# Patient Record
Sex: MALE | Race: WHITE | Employment: FULL TIME | URBAN - METROPOLITAN AREA
[De-identification: names, ages, dates, MRNs, and addresses within clinical notes are randomized per-mention and may not be internally consistent; named-entity substitution may affect disease eponyms.]

---

## 2017-02-06 ENCOUNTER — TELEPHONE (OUTPATIENT)
Dept: FAMILY MEDICINE CLINIC | Facility: CLINIC | Age: 42
End: 2017-02-06

## 2017-02-06 RX ORDER — TADALAFIL 10 MG/1
TABLET ORAL
Qty: 10 TABLET | Refills: 3 | Status: SHIPPED | OUTPATIENT
Start: 2017-02-06 | End: 2020-09-04

## 2017-02-06 NOTE — TELEPHONE ENCOUNTER
Patient had tried Levitra in the past but did not seem to help with the erectile dysfunction. He did have testosterone levels checked free which was normal.  He would like to try Cialis. Will prescribe Cialis. If not better patient will see urology.

## 2017-07-11 ENCOUNTER — OFFICE VISIT (OUTPATIENT)
Dept: FAMILY MEDICINE CLINIC | Facility: CLINIC | Age: 42
End: 2017-07-11

## 2017-07-11 VITALS
HEART RATE: 53 BPM | RESPIRATION RATE: 12 BRPM | WEIGHT: 178 LBS | OXYGEN SATURATION: 100 % | SYSTOLIC BLOOD PRESSURE: 120 MMHG | HEIGHT: 69 IN | DIASTOLIC BLOOD PRESSURE: 62 MMHG | BODY MASS INDEX: 26.36 KG/M2

## 2017-07-11 DIAGNOSIS — Z00.00 ROUTINE GENERAL MEDICAL EXAMINATION AT A HEALTH CARE FACILITY: Primary | ICD-10-CM

## 2017-07-11 DIAGNOSIS — R41.3 MEMORY LOSS: ICD-10-CM

## 2017-07-11 DIAGNOSIS — S46.002S OSTEOARTHRITIS OF LEFT SHOULDER DUE TO ROTATOR CUFF INJURY: ICD-10-CM

## 2017-07-11 DIAGNOSIS — R45.4 IRRITABILITY AND ANGER: ICD-10-CM

## 2017-07-11 DIAGNOSIS — M19.112 OSTEOARTHRITIS OF LEFT SHOULDER DUE TO ROTATOR CUFF INJURY: ICD-10-CM

## 2017-07-11 DIAGNOSIS — Z00.00 LABORATORY EXAM ORDERED AS PART OF ROUTINE GENERAL MEDICAL EXAMINATION: ICD-10-CM

## 2017-07-11 PROCEDURE — 99386 PREV VISIT NEW AGE 40-64: CPT | Performed by: FAMILY MEDICINE

## 2017-07-11 PROCEDURE — 99202 OFFICE O/P NEW SF 15 MIN: CPT | Performed by: FAMILY MEDICINE

## 2017-07-11 RX ORDER — EPINEPHRINE 0.3 MG/.3ML
0.3 INJECTION SUBCUTANEOUS ONCE
Qty: 2 EACH | Refills: 0 | Status: SHIPPED | OUTPATIENT
Start: 2017-07-11 | End: 2017-07-11

## 2017-07-12 PROBLEM — M19.112 OSTEOARTHRITIS OF LEFT SHOULDER DUE TO ROTATOR CUFF INJURY: Status: ACTIVE | Noted: 2017-07-12

## 2017-07-12 PROBLEM — R45.4 IRRITABILITY AND ANGER: Status: ACTIVE | Noted: 2017-07-12

## 2017-07-12 PROBLEM — R41.3 MEMORY LOSS: Status: ACTIVE | Noted: 2017-07-12

## 2017-07-12 PROBLEM — S46.002S OSTEOARTHRITIS OF LEFT SHOULDER DUE TO ROTATOR CUFF INJURY: Status: ACTIVE | Noted: 2017-07-12

## 2017-07-12 NOTE — PROGRESS NOTES
Sukhdeep Trevizo is a 38year old male who presents for a complete physical exam.   HPI:   Pt complains of chronic left shoulder pain, possible labral tear, with a lot of popping, crepitus. He is considering stem cell treatment from someone he knows.  Would l family history on file. Social History:  Smoking status: Never Smoker                                                              Smokeless tobacco: Never Used                      Alcohol use:  Yes              Comment: 3 times per week        REVIEW OF normoactive BS, non-distended, non-tender to palpation, no HSM/masses/pulsations  MUSCULOSKELETAL: Back with normal AROM, no joint swelling, extremities x 4 with normal strength 5/5 and symmetric and with normal AROM/PROM.    EXTREMITIES: no cyanosis, clubb discussed. If he does use he should call 911. Patient understands. I would also recommend heart scan for heart disease screening. Regarding his shoulder I do recommend he get MRI as he may have labral tear. He can then seek stem cell therapy.     Faith

## 2017-07-19 ENCOUNTER — HOSPITAL ENCOUNTER (OUTPATIENT)
Dept: MRI IMAGING | Age: 42
Discharge: HOME | End: 2017-07-19
Attending: FAMILY MEDICINE
Payer: COMMERCIAL

## 2017-07-19 DIAGNOSIS — S46.002S OSTEOARTHRITIS OF LEFT SHOULDER DUE TO ROTATOR CUFF INJURY: ICD-10-CM

## 2017-07-19 DIAGNOSIS — M19.112 OSTEOARTHRITIS OF LEFT SHOULDER DUE TO ROTATOR CUFF INJURY: ICD-10-CM

## 2017-07-19 PROCEDURE — A9575 INJ GADOTERATE MEGLUMI 0.1ML: HCPCS | Performed by: FAMILY MEDICINE

## 2017-07-19 PROCEDURE — 73223 MRI JOINT UPR EXTR W/O&W/DYE: CPT | Performed by: FAMILY MEDICINE

## 2017-08-29 ENCOUNTER — APPOINTMENT (OUTPATIENT)
Dept: LAB | Age: 42
End: 2017-08-29
Attending: FAMILY MEDICINE
Payer: COMMERCIAL

## 2017-08-29 DIAGNOSIS — Z00.00 LABORATORY EXAM ORDERED AS PART OF ROUTINE GENERAL MEDICAL EXAMINATION: ICD-10-CM

## 2017-08-29 LAB
ALBUMIN SERPL-MCNC: 4.2 G/DL (ref 3.5–4.8)
ALP LIVER SERPL-CCNC: 55 U/L (ref 45–117)
ALT SERPL-CCNC: 76 U/L (ref 17–63)
AST SERPL-CCNC: 40 U/L (ref 15–41)
BILIRUB SERPL-MCNC: 0.5 MG/DL (ref 0.1–2)
BUN BLD-MCNC: 20 MG/DL (ref 8–20)
CALCIUM BLD-MCNC: 8.8 MG/DL (ref 8.3–10.3)
CHLORIDE: 105 MMOL/L (ref 101–111)
CHOLEST SMN-MCNC: 191 MG/DL (ref ?–200)
CO2: 29 MMOL/L (ref 22–32)
CREAT BLD-MCNC: 1.06 MG/DL (ref 0.7–1.3)
ERYTHROCYTE [DISTWIDTH] IN BLOOD BY AUTOMATED COUNT: 13.1 % (ref 11.5–16)
FREE T4: 0.9 NG/DL (ref 0.9–1.8)
GLUCOSE BLD-MCNC: 96 MG/DL (ref 70–99)
HCT VFR BLD AUTO: 45.3 % (ref 37–53)
HDLC SERPL-MCNC: 66 MG/DL (ref 45–?)
HDLC SERPL: 2.89 {RATIO} (ref ?–4.97)
HGB BLD-MCNC: 15.1 G/DL (ref 13–17)
LDLC SERPL CALC-MCNC: 115 MG/DL (ref ?–130)
LDLC SERPL-MCNC: 10 MG/DL (ref 5–40)
M PROTEIN MFR SERPL ELPH: 7.3 G/DL (ref 6.1–8.3)
MCH RBC QN AUTO: 31.3 PG (ref 27–33.2)
MCHC RBC AUTO-ENTMCNC: 33.3 G/DL (ref 31–37)
MCV RBC AUTO: 94 FL (ref 80–99)
NONHDLC SERPL-MCNC: 125 MG/DL (ref ?–130)
PLATELET # BLD AUTO: 210 10(3)UL (ref 150–450)
POTASSIUM SERPL-SCNC: 4.3 MMOL/L (ref 3.6–5.1)
RBC # BLD AUTO: 4.82 X10(6)UL (ref 4.3–5.7)
RED CELL DISTRIBUTION WIDTH-SD: 44.7 FL (ref 35.1–46.3)
SODIUM SERPL-SCNC: 139 MMOL/L (ref 136–144)
TRIGLYCERIDES: 49 MG/DL (ref ?–150)
TSI SER-ACNC: 2.59 MIU/ML (ref 0.35–5.5)
WBC # BLD AUTO: 5.9 X10(3) UL (ref 4–13)

## 2017-08-29 PROCEDURE — 80061 LIPID PANEL: CPT

## 2017-08-29 PROCEDURE — 84439 ASSAY OF FREE THYROXINE: CPT

## 2017-08-29 PROCEDURE — 80053 COMPREHEN METABOLIC PANEL: CPT

## 2017-08-29 PROCEDURE — 84443 ASSAY THYROID STIM HORMONE: CPT

## 2017-08-29 PROCEDURE — 36415 COLL VENOUS BLD VENIPUNCTURE: CPT

## 2017-08-29 PROCEDURE — 85027 COMPLETE CBC AUTOMATED: CPT

## 2017-09-12 ENCOUNTER — TELEPHONE (OUTPATIENT)
Dept: FAMILY MEDICINE CLINIC | Facility: CLINIC | Age: 42
End: 2017-09-12

## 2017-09-12 DIAGNOSIS — R74.8 ELEVATED LIVER ENZYMES: Primary | ICD-10-CM

## 2017-09-12 NOTE — TELEPHONE ENCOUNTER
Spoke to pt regarding lab results. Will order ast/alt per verbal order from   Pt is in agreement. Also, pt states he would like to fill out an adhd questionnaire.  After speaking with dr. Gonzalo Kinney he is ok with patient faxing us the adhd questionna

## 2017-10-12 ENCOUNTER — TELEPHONE (OUTPATIENT)
Dept: FAMILY MEDICINE CLINIC | Facility: CLINIC | Age: 42
End: 2017-10-12

## 2017-10-19 ENCOUNTER — MED REC SCAN ONLY (OUTPATIENT)
Dept: FAMILY MEDICINE CLINIC | Facility: CLINIC | Age: 42
End: 2017-10-19

## 2017-12-05 PROBLEM — F32.A DEPRESSION: Status: ACTIVE | Noted: 2017-12-05

## 2017-12-05 NOTE — PROGRESS NOTES
HPI:    Patient ID: Ernesto Prakash is a 38year old male. HPI  The patient is here for evaluation of irritability and anger which is bad at times. He does have some depressed mood at times. He has family history of depression. No suicidal thoughts. diagnosis)  Depression, unspecified depression type  Patient counseled at length regarding the above. I believe he has mild ADD. I think he has significant clinical depression with depressed mood and anger outbursts. Will try Prozac 20 mg every morning.

## 2018-03-19 RX ORDER — FLUOXETINE HYDROCHLORIDE 20 MG/1
20 CAPSULE ORAL DAILY
Qty: 30 CAPSULE | Refills: 2 | Status: SHIPPED | OUTPATIENT
Start: 2018-03-19 | End: 2018-12-20

## 2018-12-18 RX ORDER — FLUOXETINE HYDROCHLORIDE 20 MG/1
CAPSULE ORAL
Qty: 30 CAPSULE | Refills: 0 | OUTPATIENT
Start: 2018-12-18

## 2018-12-20 RX ORDER — FLUOXETINE HYDROCHLORIDE 20 MG/1
20 CAPSULE ORAL DAILY
Qty: 30 CAPSULE | Refills: 5 | Status: SHIPPED | OUTPATIENT
Start: 2018-12-20 | End: 2020-09-04

## 2018-12-20 NOTE — TELEPHONE ENCOUNTER
Requested Medications      FLUoxetine HCl (PROZAC) 20 MG Oral Cap         Sig: Take 1 capsule (20 mg total) by mouth daily.     Disp:  30 capsule    Refills:  2    Start: 12/20/2018    Class: Normal    Non-formulary    Last ordered: 9 months ago by [facility]

## 2018-12-20 NOTE — TELEPHONE ENCOUNTER
Patient states he was told by the pharmacy that he had to call the doctor for a refill of generic Prozac, patient is out of his medication, please advise.

## 2020-09-04 ENCOUNTER — OFFICE VISIT (OUTPATIENT)
Dept: FAMILY MEDICINE CLINIC | Facility: CLINIC | Age: 45
End: 2020-09-04
Payer: COMMERCIAL

## 2020-09-04 VITALS
TEMPERATURE: 99 F | BODY MASS INDEX: 26.58 KG/M2 | DIASTOLIC BLOOD PRESSURE: 83 MMHG | HEART RATE: 83 BPM | WEIGHT: 175.38 LBS | HEIGHT: 68 IN | SYSTOLIC BLOOD PRESSURE: 127 MMHG | RESPIRATION RATE: 16 BRPM | OXYGEN SATURATION: 98 %

## 2020-09-04 DIAGNOSIS — Z00.00 ROUTINE GENERAL MEDICAL EXAMINATION AT A HEALTH CARE FACILITY: Primary | ICD-10-CM

## 2020-09-04 PROCEDURE — 3074F SYST BP LT 130 MM HG: CPT | Performed by: FAMILY MEDICINE

## 2020-09-04 PROCEDURE — 3079F DIAST BP 80-89 MM HG: CPT | Performed by: FAMILY MEDICINE

## 2020-09-04 PROCEDURE — 3008F BODY MASS INDEX DOCD: CPT | Performed by: FAMILY MEDICINE

## 2020-09-04 PROCEDURE — 99396 PREV VISIT EST AGE 40-64: CPT | Performed by: FAMILY MEDICINE

## 2020-09-08 NOTE — PROGRESS NOTES
Xi Howell is a 50year old male who presents for a complete physical exam.   HPI:   Pt complains of nothing. Patient states that he has been doing fine on fluoxetine 20 mg daily. He has been seeing a counselor recently and it has been helping a lot. ng/dL    TSH 2.590 0.350 - 5.500 mIU/mL       No current outpatient medications on file. No past medical history on file. Past Surgical History:   Procedure Laterality Date   • VASECTOMY  2/14/2014    Dr. Macrina Stock       No family history on file.    Soc adenopathy/thyromegaly/thyroid nodules/masses  CHEST: no chest tenderness  BREAST: no suspicious masses appreciated on palpation  LUNGS: CTA A/P, no wheezes/ronchi/rales/crackles, normal air excursion  CARDIOVASCULAR: RRR, no murmur, no lower extremity veronica TSH [899] [Q]      T4 FREE [866] [Q]      COMP METABOLIC PANEL [72796] [Q]

## 2020-09-15 ENCOUNTER — PATIENT MESSAGE (OUTPATIENT)
Dept: FAMILY MEDICINE CLINIC | Facility: CLINIC | Age: 45
End: 2020-09-15

## 2020-09-15 DIAGNOSIS — Z78.9 UNKNOWN STATUS OF IMMUNITY TO COVID-19 VIRUS: Primary | ICD-10-CM

## 2020-09-15 NOTE — TELEPHONE ENCOUNTER
From: Mariluz Garay  To: Leslye Boston MD  Sent: 9/15/2020 2:32 PM CDT  Subject: Non-Urgent Medical Question    Can you please add a COVID antibody test to my bloodwork?   Thanks,  Bassam Matthews

## 2020-10-05 ENCOUNTER — PATIENT MESSAGE (OUTPATIENT)
Dept: FAMILY MEDICINE CLINIC | Facility: CLINIC | Age: 45
End: 2020-10-05

## 2020-10-06 RX ORDER — FLUOXETINE HYDROCHLORIDE 20 MG/1
20 CAPSULE ORAL DAILY
Qty: 30 CAPSULE | Refills: 5 | Status: SHIPPED | OUTPATIENT
Start: 2020-10-06 | End: 2021-04-12

## 2020-10-06 NOTE — TELEPHONE ENCOUNTER
From: Mary Lou Garay  To: Raul Meneses MD  Sent: 10/5/2020 6:22 AM CDT  Subject: Prescription Question    Hi Dr. Petey Knapp,    I would like to renew my fluoxetine prescription.     Sari Milan

## 2020-10-06 NOTE — TELEPHONE ENCOUNTER
Requested Prescriptions     FLUoxetine HCl (PROZAC) 20 MG Oral Cap         The original prescription was discontinued on 9/4/2020 by Sunita Painter MA for the following reason: Patient discontinued. Renewing this prescription may not be appropriate.     Si

## 2020-10-13 ENCOUNTER — LAB ENCOUNTER (OUTPATIENT)
Dept: LAB | Age: 45
End: 2020-10-13
Attending: FAMILY MEDICINE
Payer: COMMERCIAL

## 2020-10-13 DIAGNOSIS — Z78.9 UNKNOWN STATUS OF IMMUNITY TO COVID-19 VIRUS: ICD-10-CM

## 2020-10-13 DIAGNOSIS — Z00.00 ROUTINE GENERAL MEDICAL EXAMINATION AT A HEALTH CARE FACILITY: ICD-10-CM

## 2020-10-13 PROCEDURE — 80061 LIPID PANEL: CPT | Performed by: FAMILY MEDICINE

## 2020-10-13 PROCEDURE — 36415 COLL VENOUS BLD VENIPUNCTURE: CPT | Performed by: FAMILY MEDICINE

## 2020-10-13 PROCEDURE — 84439 ASSAY OF FREE THYROXINE: CPT | Performed by: FAMILY MEDICINE

## 2020-10-13 PROCEDURE — 86769 SARS-COV-2 COVID-19 ANTIBODY: CPT | Performed by: FAMILY MEDICINE

## 2020-10-13 PROCEDURE — 80050 GENERAL HEALTH PANEL: CPT | Performed by: FAMILY MEDICINE

## 2021-02-16 ENCOUNTER — PATIENT MESSAGE (OUTPATIENT)
Dept: FAMILY MEDICINE CLINIC | Facility: CLINIC | Age: 46
End: 2021-02-16

## 2021-02-16 RX ORDER — CIPROFLOXACIN 500 MG/1
500 TABLET, FILM COATED ORAL 2 TIMES DAILY
Qty: 6 TABLET | Refills: 0 | Status: SHIPPED | OUTPATIENT
Start: 2021-02-16 | End: 2021-02-19

## 2021-02-16 NOTE — TELEPHONE ENCOUNTER
From: Mariluz Garay  To: Leslye Boston MD  Sent: 2/16/2021 9:10 AM CST  Subject: Non-Urgent Medical Question    Hi Gabriele,    I began to have some painful and frequent urination yesterday. The same today. I am suspecting urethritis or uti.  I wanted to ge

## 2021-04-12 NOTE — TELEPHONE ENCOUNTER
FLUoxetine HCl (PROZAC) 20 MG Oral Cap         Sig: Take 1 capsule (20 mg total) by mouth daily.     Disp:  90 capsule    Refills:  1    Start: 4/12/2021    Class: Normal    Non-formulary    Last ordered: 6 months ago by Lorena Sparks MD      Requesting

## 2021-04-13 RX ORDER — FLUOXETINE HYDROCHLORIDE 20 MG/1
20 CAPSULE ORAL DAILY
Qty: 90 CAPSULE | Refills: 1 | Status: SHIPPED | OUTPATIENT
Start: 2021-04-13 | End: 2021-06-28

## 2021-05-05 ENCOUNTER — PATIENT MESSAGE (OUTPATIENT)
Dept: FAMILY MEDICINE CLINIC | Facility: CLINIC | Age: 46
End: 2021-05-05

## 2021-05-05 ENCOUNTER — TELEPHONE (OUTPATIENT)
Dept: FAMILY MEDICINE CLINIC | Facility: CLINIC | Age: 46
End: 2021-05-05

## 2021-05-05 NOTE — PROGRESS NOTES
HPI:   Anabella Mendoza is a 50year old male that presents for Patient presents with:  Abnormal EKG: Discuss getting EKG, to get put on ADHD for stimulant medication. Patient is here to discuss ADD treatment. He feels well and has no complaints.   He ha good dentition. NECK: Supple, no cervical LAD, no thyromegaly. SKIN: No rashes, no skin lesion, no bruising, good turgor. HEART:  Regular rate and rhythm, no murmurs, rubs or gallops. LUNGS: Clear to auscultation bilterally, no rales/rhonchi/wheezing.

## 2021-05-05 NOTE — TELEPHONE ENCOUNTER
From: Niki Garay  To: Theresa Coats MD  Sent: 5/5/2021 3:15 PM CDT  Subject: Prescription Question    [facility] has notified me that the prescription of vyvanse requires a pre auth questionnaire from Dr. Baldev Garcia.  [facility] stated they would fax the qu

## 2021-05-10 NOTE — TELEPHONE ENCOUNTER
PA was submitted through [facility]. It was approved     Prior Auth; Coverage Start Date:04/10/2021; Coverage End Date:05/10/2022;

## 2021-05-27 ENCOUNTER — PATIENT MESSAGE (OUTPATIENT)
Dept: FAMILY MEDICINE CLINIC | Facility: CLINIC | Age: 46
End: 2021-05-27

## 2021-05-27 NOTE — TELEPHONE ENCOUNTER
From: Marianne Garay  To: Rehana Arteaga MD  Sent: 5/27/2021 1:06 PM CDT  Subject: Non-Urgent Medical Question    HI Dr. Todd Sanchez,    The 30 day trial of 30 mg Vyvanse is coming to and end.  Overall I noticed the desired effect of focus and ability to multit

## 2021-05-28 RX ORDER — DEXTROAMPHETAMINE SACCHARATE, AMPHETAMINE ASPARTATE MONOHYDRATE, DEXTROAMPHETAMINE SULFATE AND AMPHETAMINE SULFATE 2.5; 2.5; 2.5; 2.5 MG/1; MG/1; MG/1; MG/1
10 CAPSULE, EXTENDED RELEASE ORAL EVERY MORNING
Qty: 30 CAPSULE | Refills: 0 | Status: SHIPPED | OUTPATIENT
Start: 2021-05-28 | End: 2021-06-27

## 2021-06-04 ENCOUNTER — TELEPHONE (OUTPATIENT)
Dept: FAMILY MEDICINE CLINIC | Facility: CLINIC | Age: 46
End: 2021-06-04

## 2021-06-04 NOTE — TELEPHONE ENCOUNTER
Called [phone] to start PA through [employer] [facility]. Medication has to be ran as Brand name and dispense as written. Code to give pharmacy when running is: DAW9. Approved from May 5,2021-June 4,2022. Letter faxed to us.    Case ID: [id number]    Basilia

## 2021-06-30 NOTE — PROGRESS NOTES
CHIEF COMPLAINT: Patient is here for follow-up of attention deficit disorder. They are doing well with their medication and they do not have any side effects. No chest pain, palpitations, insomnia, or involuntary weight loss.      REVIEW OF SYSTEMS:   All Friends and Family:       Frequency of Social Gatherings with Friends and Family:       Attends Mandaeism Services:       Active Member of Clubs or Organizations:       Attends Club or Organization Meetings:       Marital Status:   Intimate Partner [facility] IN 3 MONTHS.

## 2021-07-07 ENCOUNTER — PATIENT MESSAGE (OUTPATIENT)
Dept: FAMILY MEDICINE CLINIC | Facility: CLINIC | Age: 46
End: 2021-07-07

## 2021-07-07 RX ORDER — TADALAFIL 10 MG/1
TABLET ORAL
Qty: 10 TABLET | Refills: 3 | Status: SHIPPED | OUTPATIENT
Start: 2021-07-07 | End: 2021-08-31

## 2021-07-07 NOTE — TELEPHONE ENCOUNTER
Medication Quantity Refills Start End   Tadalafil 10 MG Oral Tab (Discontinued) 10 tablet 3 2017   Si tab 1 hour prior to intercourse, may repeat in 72 hours as needed. Route:   (none)       Last OV 21  No future appointments.

## 2021-07-07 NOTE — TELEPHONE ENCOUNTER
From: Rasta Garay  To: Makenna Mon MD  Sent: 7/7/2021 4:53 PM CDT  Subject: Prescription Question    HI Dr. Arthur Kulkarni,    Can you refill my cialis prescription please.     Jack Eugene

## 2021-07-29 ENCOUNTER — PATIENT MESSAGE (OUTPATIENT)
Dept: FAMILY MEDICINE CLINIC | Facility: CLINIC | Age: 46
End: 2021-07-29

## 2021-07-30 RX ORDER — DEXTROAMPHETAMINE SACCHARATE, AMPHETAMINE ASPARTATE MONOHYDRATE, DEXTROAMPHETAMINE SULFATE AND AMPHETAMINE SULFATE 5; 5; 5; 5 MG/1; MG/1; MG/1; MG/1
20 CAPSULE, EXTENDED RELEASE ORAL EVERY MORNING
Qty: 30 CAPSULE | Refills: 0 | Status: SHIPPED | OUTPATIENT
Start: 2021-07-30 | End: 2021-09-01

## 2021-07-30 NOTE — TELEPHONE ENCOUNTER
Medication Quantity Refills Start End   Amphetamine-Dextroamphet ER (ADDERALL XR) 20 MG Oral Capsule SR 24 Hr () 30 capsule 0 2021   Sig:   Take 1 capsule (20 mg total) by mouth every morning.      Route:   Oral       Last OV 21 f

## 2021-08-05 ENCOUNTER — TELEPHONE (OUTPATIENT)
Dept: FAMILY MEDICINE CLINIC | Facility: CLINIC | Age: 46
End: 2021-08-05

## 2021-08-05 DIAGNOSIS — Z80.0 FAMILY HISTORY OF COLON CANCER REQUIRING SCREENING COLONOSCOPY: Primary | ICD-10-CM

## 2021-08-05 NOTE — TELEPHONE ENCOUNTER
Reason: To address the following health maintenance concerns.    Colonoscopy      Comments:   Hi Doc,      The new guidelines for [employer] colorectal cancer screening are now at 39years old. Landon Diego requested this earlier this year and was denied due to my age (44)

## 2021-08-30 ENCOUNTER — ORDER TRANSCRIPTION (OUTPATIENT)
Dept: ADMINISTRATIVE | Facility: HOSPITAL | Age: 46
End: 2021-08-30

## 2021-08-30 DIAGNOSIS — S43.432A SLAP LESION OF LEFT SHOULDER: Primary | ICD-10-CM

## 2021-08-31 RX ORDER — TADALAFIL 10 MG/1
TABLET ORAL
Qty: 10 TABLET | Refills: 3 | Status: SHIPPED | OUTPATIENT
Start: 2021-08-31

## 2021-08-31 NOTE — TELEPHONE ENCOUNTER
Tadalafil 10 MG Oral Tab         Si tab 1 hour prior to intercourse, may repeat in 72 hours as needed.     Disp:  10 tablet    Refills:  3    Start: 2021    Class: Normal    Non-formulary    Last ordered: 1 month ago by Constantin Barraza MD        T

## 2021-09-09 ENCOUNTER — HOSPITAL ENCOUNTER (OUTPATIENT)
Dept: MRI IMAGING | Age: 46
Discharge: HOME | End: 2021-09-09
Attending: ORTHOPAEDIC SURGERY
Payer: COMMERCIAL

## 2021-09-09 ENCOUNTER — HOSPITAL ENCOUNTER (OUTPATIENT)
Dept: GENERAL RADIOLOGY | Age: 46
Discharge: HOME | End: 2021-09-09
Attending: ORTHOPAEDIC SURGERY
Payer: COMMERCIAL

## 2021-09-09 DIAGNOSIS — S43.432A SLAP LESION OF LEFT SHOULDER: ICD-10-CM

## 2021-09-09 PROCEDURE — 73222 MRI JOINT UPR EXTREM W/DYE: CPT | Performed by: ORTHOPAEDIC SURGERY

## 2021-09-09 PROCEDURE — 23350 INJECTION FOR SHOULDER X-RAY: CPT | Performed by: ORTHOPAEDIC SURGERY

## 2021-09-09 PROCEDURE — A9575 INJ GADOTERATE MEGLUMI 0.1ML: HCPCS | Performed by: ORTHOPAEDIC SURGERY

## 2021-09-09 PROCEDURE — 77002 NEEDLE LOCALIZATION BY XRAY: CPT | Performed by: ORTHOPAEDIC SURGERY

## 2021-10-22 ENCOUNTER — OFFICE VISIT (OUTPATIENT)
Dept: FAMILY MEDICINE CLINIC | Facility: CLINIC | Age: 46
End: 2021-10-22
Payer: COMMERCIAL

## 2021-10-22 VITALS
TEMPERATURE: 98 F | WEIGHT: 172 LBS | RESPIRATION RATE: 16 BRPM | OXYGEN SATURATION: 99 % | SYSTOLIC BLOOD PRESSURE: 130 MMHG | HEART RATE: 72 BPM | DIASTOLIC BLOOD PRESSURE: 80 MMHG | BODY MASS INDEX: 26.07 KG/M2 | HEIGHT: 68 IN

## 2021-10-22 DIAGNOSIS — F98.8 ATTENTION DEFICIT DISORDER (ADD) WITHOUT HYPERACTIVITY: ICD-10-CM

## 2021-10-22 DIAGNOSIS — Z00.00 ROUTINE ADULT HEALTH MAINTENANCE: ICD-10-CM

## 2021-10-22 DIAGNOSIS — Z13.6 ISCHEMIC HEART DISEASE SCREEN: Primary | ICD-10-CM

## 2021-10-22 PROCEDURE — 3079F DIAST BP 80-89 MM HG: CPT | Performed by: FAMILY MEDICINE

## 2021-10-22 PROCEDURE — 3075F SYST BP GE 130 - 139MM HG: CPT | Performed by: FAMILY MEDICINE

## 2021-10-22 PROCEDURE — 3008F BODY MASS INDEX DOCD: CPT | Performed by: FAMILY MEDICINE

## 2021-10-22 PROCEDURE — 99213 OFFICE O/P EST LOW 20 MIN: CPT | Performed by: FAMILY MEDICINE

## 2021-10-22 RX ORDER — DEXTROAMPHETAMINE SACCHARATE, AMPHETAMINE ASPARTATE MONOHYDRATE, DEXTROAMPHETAMINE SULFATE AND AMPHETAMINE SULFATE 5; 5; 5; 5 MG/1; MG/1; MG/1; MG/1
20 CAPSULE, EXTENDED RELEASE ORAL DAILY
Qty: 30 CAPSULE | Refills: 0 | Status: SHIPPED | OUTPATIENT
Start: 2021-12-23 | End: 2022-01-22

## 2021-10-22 RX ORDER — DEXTROAMPHETAMINE SACCHARATE, AMPHETAMINE ASPARTATE MONOHYDRATE, DEXTROAMPHETAMINE SULFATE AND AMPHETAMINE SULFATE 5; 5; 5; 5 MG/1; MG/1; MG/1; MG/1
20 CAPSULE, EXTENDED RELEASE ORAL DAILY
Qty: 30 CAPSULE | Refills: 0 | Status: SHIPPED | OUTPATIENT
Start: 2021-10-22 | End: 2021-11-01

## 2021-10-22 RX ORDER — DEXTROAMPHETAMINE SACCHARATE, AMPHETAMINE ASPARTATE MONOHYDRATE, DEXTROAMPHETAMINE SULFATE AND AMPHETAMINE SULFATE 5; 5; 5; 5 MG/1; MG/1; MG/1; MG/1
20 CAPSULE, EXTENDED RELEASE ORAL DAILY
Qty: 30 CAPSULE | Refills: 0 | Status: SHIPPED | OUTPATIENT
Start: 2021-11-22 | End: 2021-11-01

## 2021-10-22 RX ORDER — POLYETHYLENE GLYCOL 3350, SODIUM SULFATE ANHYDROUS, SODIUM BICARBONATE, SODIUM CHLORIDE, POTASSIUM CHLORIDE 236; 22.74; 6.74; 5.86; 2.97 G/4L; G/4L; G/4L; G/4L; G/4L
4000 POWDER, FOR SOLUTION ORAL ONCE
COMMUNITY
Start: 2021-09-27 | End: 2021-12-14

## 2021-10-26 NOTE — PROGRESS NOTES
CHIEF COMPLAINT: Patient is here for follow-up of attention deficit disorder. They are doing well with their medication and they do not have any side effects. No chest pain, palpitations, insomnia, or involuntary weight loss.      REVIEW OF SYSTEMS:   All on file  Social Connections:       Frequency of Communication with Friends and Family: Not on file      Frequency of Social Gatherings with Friends and Family: Not on file      Attends Roman [employer] Services: Not on file      Active Member of Clubs or Tobin medication prescribed were discussed in detail. Pt and/or guardian verbalizes understanding. Continue Amphetamine-Dextroamphet ER (ADDERALL XR) 20 MG Oral Capsule SR 24 Hr, Take 1 capsule (20 mg total) by mouth daily. , Disp: 30 capsule, Rfl: 0  [START

## 2021-11-02 ENCOUNTER — LAB ENCOUNTER (OUTPATIENT)
Dept: LAB | Age: 46
End: 2021-11-02
Attending: STUDENT IN AN ORGANIZED HEALTH CARE EDUCATION/TRAINING PROGRAM
Payer: COMMERCIAL

## 2021-11-02 ENCOUNTER — LAB ENCOUNTER (OUTPATIENT)
Dept: LAB | Age: 46
End: 2021-11-02
Attending: FAMILY MEDICINE
Payer: COMMERCIAL

## 2021-11-02 DIAGNOSIS — Z01.818 PRE-OP TESTING: ICD-10-CM

## 2021-11-02 DIAGNOSIS — Z00.00 ROUTINE ADULT HEALTH MAINTENANCE: ICD-10-CM

## 2021-11-02 PROCEDURE — 80061 LIPID PANEL: CPT | Performed by: FAMILY MEDICINE

## 2021-11-02 PROCEDURE — 80050 GENERAL HEALTH PANEL: CPT | Performed by: FAMILY MEDICINE

## 2021-11-02 PROCEDURE — 84439 ASSAY OF FREE THYROXINE: CPT | Performed by: FAMILY MEDICINE

## 2021-11-05 PROBLEM — Z12.11 SPECIAL SCREENING FOR MALIGNANT NEOPLASM OF COLON: Status: ACTIVE | Noted: 2021-11-05

## 2021-11-05 PROBLEM — K57.30 COLON, DIVERTICULOSIS: Status: ACTIVE | Noted: 2021-11-05

## 2021-11-05 PROBLEM — K63.5 COLON POLYP: Status: ACTIVE | Noted: 2021-11-05

## 2021-11-08 ENCOUNTER — OFFICE VISIT (OUTPATIENT)
Dept: ORTHOPEDICS CLINIC | Facility: CLINIC | Age: 46
End: 2021-11-08
Payer: COMMERCIAL

## 2021-11-08 DIAGNOSIS — M25.312 INSTABILITY OF LEFT SHOULDER JOINT: ICD-10-CM

## 2021-11-08 DIAGNOSIS — M24.112 DEGENERATIVE TEAR OF GLENOID LABRUM OF LEFT SHOULDER: Primary | ICD-10-CM

## 2021-11-08 PROCEDURE — 99205 OFFICE O/P NEW HI 60 MIN: CPT | Performed by: ORTHOPAEDIC SURGERY

## 2021-11-08 NOTE — H&P
[facility] MEDICAL GROUPS - ORTHOPEDICS  [place] [address]  [phone]     NEW PATIENT VISIT - HISTORY AND PHYSICAL EXAMINATION     Name: Ivana Terry   MRN: [id number]  Date: 11/8/2021     CC: Left shoulder pain    REFE comprehensive 14 point review of systems was performed and was negative aside from the aforementioned per history of present illness. 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   Worked with [place] [employer] team.    PE:   There were no vitals filed for th with thumb up. Positive Melody, positive jerk test, positive posterior subluxation 2+ with load-and-shift maneuver.     Neurovascular Upper Extremity (Bilateral)  Motor:    5/5 EPL, Finger Abduction, , Pinch, Deltoid  Sensation:   intact to light touch relief and functional recovery. I used diagrams, radiographic studies, and a 3-dimensional model to outline his pathology, as well as general surgical principles. We reviewed the risks associated with the proposed procedure.     He will contemplate and get

## 2021-11-08 NOTE — H&P
[facility] MEDICAL GROUPS - ORTHOPEDICS  [address]  [phone]     NEW PATIENT VISIT - HISTORY AND PHYSICAL EXAMINATION     Name: Diana Churchill   MRN: [id number]  Date: 11/8/2021     CC: Left shoulder pain    REFE Pulmonary:      Effort: Pulmonary effort is normal. No respiratory distress. Abdominal:      General: There is no distension. Skin:     General: Skin is warm. Capillary Refill: Capillary refill takes less than 2 seconds.       Findings: No bruisi touch median, ulnar, radial and axillary nerve  Circulation:   Normal, 2+ radial pulse    The contralateral upper extremity is without limitation in range of motion or strength, no positive provocative maneuvers.      Radiographic Examination/Diagnostics:

## 2021-11-14 ENCOUNTER — PATIENT MESSAGE (OUTPATIENT)
Dept: ORTHOPEDICS CLINIC | Facility: CLINIC | Age: 46
End: 2021-11-14

## 2021-11-14 DIAGNOSIS — M25.312 INSTABILITY OF LEFT SHOULDER JOINT: Primary | ICD-10-CM

## 2021-11-15 NOTE — TELEPHONE ENCOUNTER
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

## 2021-11-15 NOTE — TELEPHONE ENCOUNTER
Pt scheduled surgery on 12/21/21. Dr. Navarrete Loss please enter surgery scheduling sheet.  Thank you

## 2021-11-16 ENCOUNTER — TELEPHONE (OUTPATIENT)
Dept: ORTHOPEDICS CLINIC | Facility: CLINIC | Age: 46
End: 2021-11-16

## 2021-11-16 DIAGNOSIS — M25.312 INSTABILITY OF LEFT SHOULDER JOINT: Primary | ICD-10-CM

## 2021-11-16 NOTE — TELEPHONE ENCOUNTER
OR BOOKING SHEET SHOULDER  Name: Hernan Duque  MRN: [id number]   : 1975  Diagnosis:  [x]? 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 Ambulatory  []? Overnight for LEAH  []? Overnight for observation and pain control  []?  Inp

## 2021-11-16 NOTE — TELEPHONE ENCOUNTER
Surgeon: Dr. Annemarie Ferrera    Date of Surgery: 12/21/21       Post Op Appt: 12/29/21     Facility: [facility] or Outpatient: Outpatient    Surgical Assistant: yes    Preadmission Testing Ordered:  yes    Pre-Op Clearance Requested:   PCP: no   Cardiac: no

## 2021-12-02 NOTE — TELEPHONE ENCOUNTER
Barber Garcia [id number] is for only OP CPT CODE 71276 ([id number] was removed from initial case)- changed facility from [facility] to [facility]

## 2021-12-14 RX ORDER — ACETAMINOPHEN 500 MG
1000 TABLET ORAL ONCE
Status: CANCELLED | OUTPATIENT
Start: 2021-12-14 | End: 2021-12-14

## 2021-12-15 NOTE — TELEPHONE ENCOUNTER
Gaston Garay's case has been scheduled/rescheduled at  on 12/21/2021 at [facility]  Received: 1 week ago  Lev Bose, [address]  Patient Name: Roseann Cisneros    MRN: [id number]     LEFT SHOULDER ARTHROSCOPY SUBACROMIAL DECO

## 2021-12-18 ENCOUNTER — LAB ENCOUNTER (OUTPATIENT)
Dept: LAB | Age: 46
End: 2021-12-18
Attending: ORTHOPAEDIC SURGERY
Payer: COMMERCIAL

## 2021-12-18 DIAGNOSIS — S46.002S OSTEOARTHRITIS OF LEFT SHOULDER DUE TO ROTATOR CUFF INJURY: ICD-10-CM

## 2021-12-18 DIAGNOSIS — M19.112 OSTEOARTHRITIS OF LEFT SHOULDER DUE TO ROTATOR CUFF INJURY: ICD-10-CM

## 2021-12-20 ENCOUNTER — TELEPHONE (OUTPATIENT)
Dept: ORTHOPEDICS CLINIC | Facility: CLINIC | Age: 46
End: 2021-12-20

## 2021-12-20 ENCOUNTER — ANESTHESIA EVENT (OUTPATIENT)
Dept: SURGERY | Facility: HOSPITAL | Age: 46
End: 2021-12-20
Payer: COMMERCIAL

## 2021-12-20 DIAGNOSIS — M25.312 INSTABILITY OF LEFT SHOULDER JOINT: Primary | ICD-10-CM

## 2021-12-20 RX ORDER — TRAMADOL HYDROCHLORIDE 50 MG/1
TABLET ORAL
Qty: 20 TABLET | Refills: 1 | Status: SHIPPED | OUTPATIENT
Start: 2021-12-20

## 2021-12-20 RX ORDER — ONDANSETRON 4 MG/1
4 TABLET, ORALLY DISINTEGRATING ORAL EVERY 8 HOURS PRN
Qty: 8 TABLET | Refills: 0 | Status: SHIPPED | OUTPATIENT
Start: 2021-12-20 | End: 2021-12-29 | Stop reason: ALTCHOICE

## 2021-12-21 ENCOUNTER — ANESTHESIA (OUTPATIENT)
Dept: SURGERY | Facility: HOSPITAL | Age: 46
End: 2021-12-21
Payer: COMMERCIAL

## 2021-12-21 ENCOUNTER — HOSPITAL ENCOUNTER (OUTPATIENT)
Facility: HOSPITAL | Age: 46
Setting detail: HOSPITAL OUTPATIENT SURGERY
Discharge: HOME | End: 2021-12-21
Attending: ORTHOPAEDIC SURGERY | Admitting: ORTHOPAEDIC SURGERY
Payer: COMMERCIAL

## 2021-12-21 VITALS
HEIGHT: 68 IN | OXYGEN SATURATION: 98 % | HEART RATE: 61 BPM | BODY MASS INDEX: 25.31 KG/M2 | WEIGHT: 167 LBS | TEMPERATURE: 98 F | SYSTOLIC BLOOD PRESSURE: 146 MMHG | DIASTOLIC BLOOD PRESSURE: 69 MMHG | RESPIRATION RATE: 16 BRPM

## 2021-12-21 DIAGNOSIS — M19.112 OSTEOARTHRITIS OF LEFT SHOULDER DUE TO ROTATOR CUFF INJURY: Primary | ICD-10-CM

## 2021-12-21 DIAGNOSIS — S46.002S OSTEOARTHRITIS OF LEFT SHOULDER DUE TO ROTATOR CUFF INJURY: Primary | ICD-10-CM

## 2021-12-21 DIAGNOSIS — M25.312 INSTABILITY OF LEFT SHOULDER JOINT: ICD-10-CM

## 2021-12-21 PROCEDURE — 0RBK4ZZ EXCISION OF LEFT SHOULDER JOINT, PERCUTANEOUS ENDOSCOPIC APPROACH: ICD-10-PCS | Performed by: ORTHOPAEDIC SURGERY

## 2021-12-21 PROCEDURE — 0RQK4ZZ REPAIR LEFT SHOULDER JOINT, PERCUTANEOUS ENDOSCOPIC APPROACH: ICD-10-PCS | Performed by: ORTHOPAEDIC SURGERY

## 2021-12-21 PROCEDURE — 0RHK44Z INSERTION OF INTERNAL FIXATION DEVICE INTO LEFT SHOULDER JOINT, PERCUTANEOUS ENDOSCOPIC APPROACH: ICD-10-PCS | Performed by: ORTHOPAEDIC SURGERY

## 2021-12-21 PROCEDURE — 0LS44ZZ REPOSITION LEFT UPPER ARM TENDON, PERCUTANEOUS ENDOSCOPIC APPROACH: ICD-10-PCS | Performed by: ORTHOPAEDIC SURGERY

## 2021-12-21 DEVICE — PEEK SWIVELOCK C,4.75X19.1MM
Type: IMPLANTABLE DEVICE | Site: SHOULDER | Status: FUNCTIONAL
Brand: ARTHREX®

## 2021-12-21 DEVICE — SUTR ANCH,PEEK P-LCK,2.9X 12.5MM
Type: IMPLANTABLE DEVICE | Site: SHOULDER | Status: FUNCTIONAL
Brand: ARTHREX®

## 2021-12-21 DEVICE — SUTR ANCH PEEK S-TAK KNOTLESS 3X12.7MM
Type: IMPLANTABLE DEVICE | Site: SHOULDER | Status: FUNCTIONAL
Brand: ARTHREX®

## 2021-12-21 DEVICE — SUTURE  ANCHOR, 2.4MM PUSHLOCK , PEEK
Type: IMPLANTABLE DEVICE | Site: SHOULDER | Status: FUNCTIONAL
Brand: ARTHREX®

## 2021-12-21 RX ORDER — MIDAZOLAM HYDROCHLORIDE 1 MG/ML
INJECTION INTRAMUSCULAR; INTRAVENOUS AS NEEDED
Status: DISCONTINUED | OUTPATIENT
Start: 2021-12-21 | End: 2021-12-21 | Stop reason: SURG

## 2021-12-21 RX ORDER — CEFAZOLIN SODIUM/WATER 2 G/20 ML
SYRINGE (ML) INTRAVENOUS
Status: DISCONTINUED
Start: 2021-12-21 | End: 2021-12-21

## 2021-12-21 RX ORDER — KETOROLAC TROMETHAMINE 30 MG/ML
30 INJECTION, SOLUTION INTRAMUSCULAR; INTRAVENOUS EVERY 6 HOURS PRN
OUTPATIENT
Start: 2021-12-21 | End: 2021-12-23

## 2021-12-21 RX ORDER — ONDANSETRON 2 MG/ML
4 INJECTION INTRAMUSCULAR; INTRAVENOUS AS NEEDED
OUTPATIENT
Start: 2021-12-21 | End: 2021-12-21

## 2021-12-21 RX ORDER — HYDROMORPHONE HYDROCHLORIDE 1 MG/ML
0.4 INJECTION, SOLUTION INTRAMUSCULAR; INTRAVENOUS; SUBCUTANEOUS EVERY 5 MIN PRN
OUTPATIENT
Start: 2021-12-21 | End: 2021-12-21

## 2021-12-21 RX ORDER — KETOROLAC TROMETHAMINE 30 MG/ML
15 INJECTION, SOLUTION INTRAMUSCULAR; INTRAVENOUS EVERY 6 HOURS PRN
OUTPATIENT
Start: 2021-12-21 | End: 2021-12-23

## 2021-12-21 RX ORDER — HYDRALAZINE HYDROCHLORIDE 20 MG/ML
INJECTION INTRAMUSCULAR; INTRAVENOUS AS NEEDED
Status: DISCONTINUED | OUTPATIENT
Start: 2021-12-21 | End: 2021-12-21 | Stop reason: SURG

## 2021-12-21 RX ORDER — HYDROCODONE BITARTRATE AND ACETAMINOPHEN 5; 325 MG/1; MG/1
1 TABLET ORAL AS NEEDED
OUTPATIENT
Start: 2021-12-21

## 2021-12-21 RX ORDER — ROPIVACAINE HYDROCHLORIDE 5 MG/ML
INJECTION, SOLUTION EPIDURAL; INFILTRATION; PERINEURAL AS NEEDED
Status: DISCONTINUED | OUTPATIENT
Start: 2021-12-21 | End: 2021-12-21 | Stop reason: SURG

## 2021-12-21 RX ORDER — HYDROCODONE BITARTRATE AND ACETAMINOPHEN 5; 325 MG/1; MG/1
2 TABLET ORAL AS NEEDED
OUTPATIENT
Start: 2021-12-21

## 2021-12-21 RX ORDER — KETAMINE HYDROCHLORIDE 50 MG/ML
INJECTION, SOLUTION, CONCENTRATE INTRAMUSCULAR; INTRAVENOUS AS NEEDED
Status: DISCONTINUED | OUTPATIENT
Start: 2021-12-21 | End: 2021-12-21 | Stop reason: SURG

## 2021-12-21 RX ORDER — LABETALOL HYDROCHLORIDE 5 MG/ML
INJECTION, SOLUTION INTRAVENOUS AS NEEDED
Status: DISCONTINUED | OUTPATIENT
Start: 2021-12-21 | End: 2021-12-21 | Stop reason: SURG

## 2021-12-21 RX ORDER — SODIUM CHLORIDE, SODIUM LACTATE, POTASSIUM CHLORIDE, CALCIUM CHLORIDE 600; 310; 30; 20 MG/100ML; MG/100ML; MG/100ML; MG/100ML
INJECTION, SOLUTION INTRAVENOUS CONTINUOUS
OUTPATIENT
Start: 2021-12-21

## 2021-12-21 RX ORDER — ACETAMINOPHEN 500 MG
1000 TABLET ORAL EVERY 6 HOURS PRN
COMMUNITY

## 2021-12-21 RX ORDER — ACETAMINOPHEN 325 MG/1
650 TABLET ORAL ONCE
OUTPATIENT
Start: 2021-12-21 | End: 2021-12-21

## 2021-12-21 RX ORDER — IBUPROFEN 600 MG/1
600 TABLET ORAL ONCE AS NEEDED
OUTPATIENT
Start: 2021-12-21 | End: 2021-12-21

## 2021-12-21 RX ORDER — ACETAMINOPHEN 500 MG
1000 TABLET ORAL ONCE AS NEEDED
OUTPATIENT
Start: 2021-12-21 | End: 2021-12-21

## 2021-12-21 RX ORDER — TRANEXAMIC ACID 10 MG/ML
1000 INJECTION, SOLUTION INTRAVENOUS ONCE
Status: DISCONTINUED | OUTPATIENT
Start: 2021-12-21 | End: 2021-12-21 | Stop reason: HOSPADM

## 2021-12-21 RX ORDER — CEFAZOLIN SODIUM/WATER 2 G/20 ML
2 SYRINGE (ML) INTRAVENOUS ONCE
Status: COMPLETED | OUTPATIENT
Start: 2021-12-21 | End: 2021-12-21

## 2021-12-21 RX ORDER — NALOXONE HYDROCHLORIDE 0.4 MG/ML
80 INJECTION, SOLUTION INTRAMUSCULAR; INTRAVENOUS; SUBCUTANEOUS AS NEEDED
OUTPATIENT
Start: 2021-12-21 | End: 2021-12-21

## 2021-12-21 RX ORDER — DEXAMETHASONE SODIUM PHOSPHATE 4 MG/ML
VIAL (ML) INJECTION AS NEEDED
Status: DISCONTINUED | OUTPATIENT
Start: 2021-12-21 | End: 2021-12-21 | Stop reason: SURG

## 2021-12-21 RX ORDER — SODIUM CHLORIDE, SODIUM LACTATE, POTASSIUM CHLORIDE, CALCIUM CHLORIDE 600; 310; 30; 20 MG/100ML; MG/100ML; MG/100ML; MG/100ML
INJECTION, SOLUTION INTRAVENOUS CONTINUOUS
Status: DISCONTINUED | OUTPATIENT
Start: 2021-12-21 | End: 2021-12-21

## 2021-12-21 RX ADMIN — DEXAMETHASONE SODIUM PHOSPHATE 2 MG: 4 MG/ML VIAL (ML) INJECTION at 07:08:00

## 2021-12-21 RX ADMIN — HYDRALAZINE HYDROCHLORIDE 5 MG: 20 INJECTION INTRAMUSCULAR; INTRAVENOUS at 08:48:00

## 2021-12-21 RX ADMIN — CEFAZOLIN SODIUM/WATER 2 G: 2 G/20 ML SYRINGE (ML) INTRAVENOUS at 07:10:00

## 2021-12-21 RX ADMIN — LABETALOL HYDROCHLORIDE 5 MG: 5 INJECTION, SOLUTION INTRAVENOUS at 09:11:00

## 2021-12-21 RX ADMIN — MIDAZOLAM HYDROCHLORIDE 1 MG: 1 INJECTION INTRAMUSCULAR; INTRAVENOUS at 07:03:00

## 2021-12-21 RX ADMIN — LABETALOL HYDROCHLORIDE 5 MG: 5 INJECTION, SOLUTION INTRAVENOUS at 09:00:00

## 2021-12-21 RX ADMIN — KETAMINE HYDROCHLORIDE 100 MG: 50 INJECTION, SOLUTION, CONCENTRATE INTRAMUSCULAR; INTRAVENOUS at 07:15:00

## 2021-12-21 RX ADMIN — MIDAZOLAM HYDROCHLORIDE 1 MG: 1 INJECTION INTRAMUSCULAR; INTRAVENOUS at 07:15:00

## 2021-12-21 RX ADMIN — ROPIVACAINE HYDROCHLORIDE 15 ML: 5 INJECTION, SOLUTION EPIDURAL; INFILTRATION; PERINEURAL at 07:08:00

## 2021-12-21 RX ADMIN — SODIUM CHLORIDE, SODIUM LACTATE, POTASSIUM CHLORIDE, CALCIUM CHLORIDE: 600; 310; 30; 20 INJECTION, SOLUTION INTRAVENOUS at 10:51:00

## 2021-12-21 RX ADMIN — HYDRALAZINE HYDROCHLORIDE 2.5 MG: 20 INJECTION INTRAMUSCULAR; INTRAVENOUS at 09:02:00

## 2021-12-21 RX ADMIN — LABETALOL HYDROCHLORIDE 5 MG: 5 INJECTION, SOLUTION INTRAVENOUS at 08:55:00

## 2021-12-21 RX ADMIN — HYDRALAZINE HYDROCHLORIDE 2.5 MG: 20 INJECTION INTRAMUSCULAR; INTRAVENOUS at 08:55:00

## 2021-12-21 NOTE — ANESTHESIA PREPROCEDURE EVALUATION
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 Procedure Laterality Date   • COLONOSCOPY     • VASECTOMY  2/14/2014    Dr. Sandra Linton      Social History    Tobacco Use      Smoking status: Never Smoker      Smokeless tobacco: Never Used    Alcohol use:  Yes      Alcohol/week: 3.0 - 5.0 standard drinks

## 2021-12-21 NOTE — H&P
The below referenced H&P was reviewed by [facility], MD, the patient was examined and no significant changes have occurred in the patient's condition since the H&P was performed.   I discussed with the patient and/or legal representative the potenti Cancer Maternal Grandfather    • Stroke Paternal Grandfather        ALLERGIES:  Erythromycin    MEDICATIONS:   Current Outpatient Medications   Medication Sig Dispense Refill   • ondansetron (ZOFRAN ODT) 4 MG Oral Tablet Dispersible Take 1 tablet (4 mg tot Psychiatric:         Mood and Affect: Mood normal.     Examination of the left shoulder demonstrates:     Skin is intact, warm and dry.    Cervical:  Full ROM    Deformity:   none  Atrophy:   none    Scapular winging: Negative    Palpation:     AC Joint circumferential labral tearing primary localized to the superior and posterior aspects. Given his high level of functional status and strength I believe that he is a good candidate for arthroscopic posterior labral repair and stabilization.     PLAN:   We

## 2021-12-21 NOTE — OPERATIVE REPORT
[facility] OPERATIVE RECORD  ATTENDING SURGEON: Sunny Keane MD  PRELIMINARY DIAGNOSIS:  1. Left posterior and posterior-inferior labral tear  2. Left Shoulder superior labral tear extending into biceps anchor  3.   Left Shoulder Subacromial bursi addressed with arthroscopic biceps tenodesis. Mild subacromial bursitis, adequately debrided.     Indications:  Chapincito Mena is a 48year old male with history, physical examination, and imaging findings consistent with the aforementioned diagnoses.   They had amy recommended timeframe prior to skin incision. Examination under anesthesia demonstrated full shoulder range of motion. Positive posterior load-and-shift with clunk. Posterior inferior instability appreciable, stable to anterior stresses.   A 15 blade wa then extracted from the anterolateral portal with the aid of the Kocher clamp. The Biceps tendon was then prepared with a fiberloop. The biceps tendon was then loaded into a 4.75 mm swivel lock anchor.   At this time the Biceps tendon loaded swivel lock a the labrum and fixed into the approximately 4:30 position with an additional 2.4 mm push lock anchor. This incorporated the inferior suture as well and led to an excellent fixation construct.   The shoulder was stable to posterior load-and-shift testing at

## 2021-12-21 NOTE — ANESTHESIA POSTPROCEDURE EVALUATION
[address] [place] [address] Dirve Patient Status:  Hospital Outpatient Surgery   Age/Gender 48year old male MRN [id number]   Location [address] [place] [address] Attending Henok Hernandez MD   Hosp Day # 0 PCP Shakira Jacinto MD       Anesth

## 2021-12-21 NOTE — BRIEF OP NOTE
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  Arthroscopic Biceps Teno

## 2021-12-29 ENCOUNTER — OFFICE VISIT (OUTPATIENT)
Dept: ORTHOPEDICS CLINIC | Facility: CLINIC | Age: 46
End: 2021-12-29
Payer: COMMERCIAL

## 2021-12-29 VITALS — BODY MASS INDEX: 25.31 KG/M2 | HEIGHT: 68 IN | WEIGHT: 167 LBS

## 2021-12-29 DIAGNOSIS — Z98.890 S/P ARTHROSCOPY OF SHOULDER: Primary | ICD-10-CM

## 2021-12-29 PROCEDURE — 99024 POSTOP FOLLOW-UP VISIT: CPT | Performed by: PHYSICIAN ASSISTANT

## 2021-12-29 PROCEDURE — 3008F BODY MASS INDEX DOCD: CPT | Performed by: PHYSICIAN ASSISTANT

## 2021-12-29 NOTE — PROGRESS NOTES
[facility]  [facility] [address] [facility] [phone]       Name: Mary Rausch   MRN: [id number]  Date: 12/29/2021     REASON FOR VISIT: First Post-Surgical Visit   Surgery: Left shoulder ar well-nourished, no acute distress. Tegaderm dressings were removed, and Steri-Strips were maintained and kept in place. Incisional sites are clean dry intact without signs of active pathology. Calf is soft and nontender to palpation.     The contral

## 2022-01-27 ENCOUNTER — OFFICE VISIT (OUTPATIENT)
Dept: ORTHOPEDICS CLINIC | Facility: CLINIC | Age: 47
End: 2022-01-27
Payer: COMMERCIAL

## 2022-01-27 VITALS — BODY MASS INDEX: 25.18 KG/M2 | HEIGHT: 69 IN | WEIGHT: 170 LBS

## 2022-01-27 DIAGNOSIS — M72.0 DUPUYTREN'S CONTRACTURE: Primary | ICD-10-CM

## 2022-01-27 PROCEDURE — 99202 OFFICE O/P NEW SF 15 MIN: CPT | Performed by: ORTHOPAEDIC SURGERY

## 2022-01-27 PROCEDURE — 3008F BODY MASS INDEX DOCD: CPT | Performed by: ORTHOPAEDIC SURGERY

## 2022-01-27 NOTE — H&P
Clinic Note [facility]     Assessment/Plan:  48year old male    1. Left ring finger Dupuytren's–nodule and palpable cord without contracture–would recommend observation.   Should the contracture become more clinically significant around 25 degrees c needed for pain. (Patient not taking: Reported on 12/29/2021) 20 tablet 1   • Tadalafil 10 MG Oral Tab 1 tab 1 hour prior to intercourse, may repeat in 72 hours as needed.  (Patient not taking: No sig reported) 10 tablet 3       Erythromycin            OTHE

## 2022-01-29 NOTE — ADDENDUM NOTE
Addendum  created 01/29/22 1618 by Mary Jimenes MD    Child order released for a procedure order, Clinical Note Signed, Intraprocedure Blocks edited, Intraprocedure Event edited, SmartForm saved

## 2022-01-29 NOTE — ANESTHESIA PROCEDURE NOTES
[facility]  Performed by: Ashley Puente MD  Authorized by: Ashley Puente MD       General Information and Staff    Start Time:  12/21/2021 7:05 AM  End Time:  12/21/2021 7:08 AM  Anesthesiologist:  Ashley Puente MD  Patient Location:  OR

## 2022-02-09 ENCOUNTER — OFFICE VISIT (OUTPATIENT)
Dept: ORTHOPEDICS CLINIC | Facility: CLINIC | Age: 47
End: 2022-02-09
Payer: COMMERCIAL

## 2022-02-09 VITALS — BODY MASS INDEX: 25.18 KG/M2 | WEIGHT: 170 LBS | HEIGHT: 69 IN

## 2022-02-09 DIAGNOSIS — Z98.890 S/P ARTHROSCOPY OF SHOULDER: Primary | ICD-10-CM

## 2022-02-09 PROCEDURE — 3008F BODY MASS INDEX DOCD: CPT | Performed by: PHYSICIAN ASSISTANT

## 2022-02-09 PROCEDURE — 99024 POSTOP FOLLOW-UP VISIT: CPT | Performed by: PHYSICIAN ASSISTANT

## 2022-02-28 ENCOUNTER — TELEPHONE (OUTPATIENT)
Dept: FAMILY MEDICINE CLINIC | Facility: CLINIC | Age: 47
End: 2022-02-28

## 2022-02-28 RX ORDER — FINASTERIDE 1 MG/1
1 TABLET, FILM COATED ORAL DAILY
Qty: 90 TABLET | Refills: 0 | Status: SHIPPED | OUTPATIENT
Start: 2022-02-28

## 2022-02-28 NOTE — TELEPHONE ENCOUNTER
Patient informed me last week that he is interested in taking Propecia oral for help prevention of hair loss. I did discuss the risk and benefit of Propecia with him and will prescribe.

## 2022-03-21 ENCOUNTER — OFFICE VISIT (OUTPATIENT)
Dept: FAMILY MEDICINE CLINIC | Facility: CLINIC | Age: 47
End: 2022-03-21
Payer: COMMERCIAL

## 2022-03-21 VITALS
SYSTOLIC BLOOD PRESSURE: 120 MMHG | TEMPERATURE: 97 F | OXYGEN SATURATION: 99 % | RESPIRATION RATE: 16 BRPM | HEIGHT: 69 IN | WEIGHT: 174.25 LBS | HEART RATE: 69 BPM | DIASTOLIC BLOOD PRESSURE: 80 MMHG | BODY MASS INDEX: 25.81 KG/M2

## 2022-03-21 DIAGNOSIS — Z00.00 ROUTINE ADULT HEALTH MAINTENANCE: Primary | ICD-10-CM

## 2022-03-21 PROCEDURE — 3008F BODY MASS INDEX DOCD: CPT | Performed by: FAMILY MEDICINE

## 2022-03-21 PROCEDURE — 3074F SYST BP LT 130 MM HG: CPT | Performed by: FAMILY MEDICINE

## 2022-03-21 PROCEDURE — 3079F DIAST BP 80-89 MM HG: CPT | Performed by: FAMILY MEDICINE

## 2022-03-21 PROCEDURE — 99396 PREV VISIT EST AGE 40-64: CPT | Performed by: FAMILY MEDICINE

## 2022-03-21 RX ORDER — FLUOXETINE HYDROCHLORIDE 20 MG/1
20 CAPSULE ORAL DAILY
Qty: 90 CAPSULE | Refills: 3 | Status: SHIPPED | OUTPATIENT
Start: 2022-03-21

## 2022-04-25 RX ORDER — DEXTROAMPHETAMINE SACCHARATE, AMPHETAMINE ASPARTATE MONOHYDRATE, DEXTROAMPHETAMINE SULFATE AND AMPHETAMINE SULFATE 5; 5; 5; 5 MG/1; MG/1; MG/1; MG/1
20 CAPSULE, EXTENDED RELEASE ORAL DAILY
Qty: 30 CAPSULE | Refills: 0 | Status: SHIPPED | OUTPATIENT
Start: 2022-04-25 | End: 2022-05-25

## 2022-04-25 RX ORDER — FINASTERIDE 1 MG/1
1 TABLET, FILM COATED ORAL DAILY
Qty: 90 TABLET | Refills: 3 | Status: SHIPPED | OUTPATIENT
Start: 2022-04-25

## 2022-04-25 NOTE — TELEPHONE ENCOUNTER
Requesting Adderall XR 20mg  LOV: 3/21/22 Physical  RTC: 1 year  Last Relevant Labs: 11/2/21  Filled: 3/31/22 #30 with 0 refills    No future appointments.     Rx pended and routed for approval/denial

## 2022-05-10 ENCOUNTER — HOSPITAL ENCOUNTER (OUTPATIENT)
Dept: MRI IMAGING | Facility: HOSPITAL | Age: 47
Discharge: HOME | End: 2022-05-10
Attending: CHIROPRACTOR
Payer: COMMERCIAL

## 2022-05-10 DIAGNOSIS — M25.562 LEFT KNEE PAIN: ICD-10-CM

## 2022-05-10 PROCEDURE — 73721 MRI JNT OF LWR EXTRE W/O DYE: CPT | Performed by: CHIROPRACTOR

## 2022-05-31 ENCOUNTER — PATIENT MESSAGE (OUTPATIENT)
Dept: FAMILY MEDICINE CLINIC | Facility: CLINIC | Age: 47
End: 2022-05-31

## 2022-05-31 RX ORDER — DEXTROAMPHETAMINE SACCHARATE, AMPHETAMINE ASPARTATE MONOHYDRATE, DEXTROAMPHETAMINE SULFATE AND AMPHETAMINE SULFATE 5; 5; 5; 5 MG/1; MG/1; MG/1; MG/1
20 CAPSULE, EXTENDED RELEASE ORAL DAILY
Qty: 30 CAPSULE | Refills: 0 | Status: SHIPPED | OUTPATIENT
Start: 2022-05-31 | End: 2022-06-30

## 2022-05-31 NOTE — TELEPHONE ENCOUNTER
From: Benedict Garay  To: Gustavo Soto MD  Sent: 5/31/2022 10:37 AM CDT  Subject: Jayesh Jose,    My adderall prescription is absent on my chart for requesting a refill. Would you like me to schedule to a refill/updated script? Let me know as I am low. I have had no issues with the dosage and have been taking it mostly on the weekdays with some weekends off. Adilene Painting.

## 2022-07-29 ENCOUNTER — TELEPHONE (OUTPATIENT)
Dept: FAMILY MEDICINE CLINIC | Facility: CLINIC | Age: 47
End: 2022-07-29

## 2022-09-28 PROBLEM — F98.8 ATTENTION DEFICIT DISORDER (ADD) WITHOUT HYPERACTIVITY: Status: ACTIVE | Noted: 2022-09-28

## 2022-12-28 RX ORDER — TADALAFIL 10 MG/1
TABLET ORAL
Qty: 10 TABLET | Refills: 3 | Status: SHIPPED | OUTPATIENT
Start: 2022-12-28

## 2022-12-28 RX ORDER — DEXTROAMPHETAMINE SACCHARATE, AMPHETAMINE ASPARTATE MONOHYDRATE, DEXTROAMPHETAMINE SULFATE AND AMPHETAMINE SULFATE 5; 5; 5; 5 MG/1; MG/1; MG/1; MG/1
20 CAPSULE, EXTENDED RELEASE ORAL DAILY
Qty: 30 CAPSULE | Refills: 0 | Status: SHIPPED | OUTPATIENT
Start: 2022-12-28 | End: 2023-01-27

## 2022-12-28 NOTE — TELEPHONE ENCOUNTER
Requesting Adderall XR 20mg  LOV: 9/28/22  RTC: 6 months  Last Relevant Labs: 11/2/21  Filled: 11/29/22 #30 with 0 refills    Requesting Tadalafil 10mg  LOV: 9/28/22  RTC: 6 months  Last Relevant Labs: 11/2/21  Filled: 9/28/22 #10 with 3 refills    No future appointments.     RXs pended and routed for approval/denial

## 2023-04-01 ENCOUNTER — PATIENT MESSAGE (OUTPATIENT)
Dept: FAMILY MEDICINE CLINIC | Facility: CLINIC | Age: 48
End: 2023-04-01

## 2023-04-03 ENCOUNTER — LABORATORY ENCOUNTER (OUTPATIENT)
Dept: LAB | Age: 48
End: 2023-04-03
Attending: FAMILY MEDICINE
Payer: COMMERCIAL

## 2023-04-03 DIAGNOSIS — R45.4 IRRITABILITY AND ANGER: ICD-10-CM

## 2023-04-03 DIAGNOSIS — Z00.00 ROUTINE ADULT HEALTH MAINTENANCE: ICD-10-CM

## 2023-04-03 LAB
ALBUMIN SERPL-MCNC: 4.4 G/DL (ref 3.4–5)
ALBUMIN/GLOB SERPL: 1.5 {RATIO} (ref 1–2)
ALP LIVER SERPL-CCNC: 52 U/L
ALT SERPL-CCNC: 37 U/L
ANION GAP SERPL CALC-SCNC: 2 MMOL/L (ref 0–18)
AST SERPL-CCNC: 25 U/L (ref 15–37)
BILIRUB SERPL-MCNC: 1.1 MG/DL (ref 0.1–2)
BUN BLD-MCNC: 18 MG/DL (ref 7–18)
CALCIUM BLD-MCNC: 9.4 MG/DL (ref 8.5–10.1)
CHLORIDE SERPL-SCNC: 105 MMOL/L (ref 98–112)
CHOLEST SERPL-MCNC: 168 MG/DL (ref ?–200)
CO2 SERPL-SCNC: 28 MMOL/L (ref 21–32)
CREAT BLD-MCNC: 0.9 MG/DL
ERYTHROCYTE [DISTWIDTH] IN BLOOD BY AUTOMATED COUNT: 12.6 %
FASTING PATIENT LIPID ANSWER: YES
FASTING STATUS PATIENT QL REPORTED: YES
GFR SERPLBLD BASED ON 1.73 SQ M-ARVRAT: 105 ML/MIN/1.73M2 (ref 60–?)
GLOBULIN PLAS-MCNC: 2.9 G/DL (ref 2.8–4.4)
GLUCOSE BLD-MCNC: 90 MG/DL (ref 70–99)
HCT VFR BLD AUTO: 42 %
HDLC SERPL-MCNC: 78 MG/DL (ref 40–59)
HGB BLD-MCNC: 14.2 G/DL
LDLC SERPL CALC-MCNC: 75 MG/DL (ref ?–100)
MCH RBC QN AUTO: 31.6 PG (ref 26–34)
MCHC RBC AUTO-ENTMCNC: 33.8 G/DL (ref 31–37)
MCV RBC AUTO: 93.3 FL
NONHDLC SERPL-MCNC: 90 MG/DL (ref ?–130)
OSMOLALITY SERPL CALC.SUM OF ELEC: 281 MOSM/KG (ref 275–295)
PLATELET # BLD AUTO: 221 10(3)UL (ref 150–450)
POTASSIUM SERPL-SCNC: 3.5 MMOL/L (ref 3.5–5.1)
PROT SERPL-MCNC: 7.3 G/DL (ref 6.4–8.2)
RBC # BLD AUTO: 4.5 X10(6)UL
SODIUM SERPL-SCNC: 135 MMOL/L (ref 136–145)
T4 FREE SERPL-MCNC: 0.8 NG/DL (ref 0.8–1.7)
TRIGL SERPL-MCNC: 80 MG/DL (ref 30–149)
TSI SER-ACNC: 1.17 MIU/ML (ref 0.36–3.74)
VLDLC SERPL CALC-MCNC: 12 MG/DL (ref 0–30)
WBC # BLD AUTO: 8.7 X10(3) UL (ref 4–11)

## 2023-04-03 PROCEDURE — 80050 GENERAL HEALTH PANEL: CPT | Performed by: FAMILY MEDICINE

## 2023-04-03 PROCEDURE — 84439 ASSAY OF FREE THYROXINE: CPT | Performed by: FAMILY MEDICINE

## 2023-04-03 PROCEDURE — 84402 ASSAY OF FREE TESTOSTERONE: CPT | Performed by: FAMILY MEDICINE

## 2023-04-03 PROCEDURE — 84403 ASSAY OF TOTAL TESTOSTERONE: CPT | Performed by: FAMILY MEDICINE

## 2023-04-03 PROCEDURE — 80061 LIPID PANEL: CPT | Performed by: FAMILY MEDICINE

## 2023-04-03 RX ORDER — DEXTROAMPHETAMINE SACCHARATE, AMPHETAMINE ASPARTATE MONOHYDRATE, DEXTROAMPHETAMINE SULFATE AND AMPHETAMINE SULFATE 5; 5; 5; 5 MG/1; MG/1; MG/1; MG/1
20 CAPSULE, EXTENDED RELEASE ORAL DAILY
Qty: 30 CAPSULE | Refills: 0 | Status: SHIPPED | OUTPATIENT
Start: 2023-04-03 | End: 2023-05-03

## 2023-04-03 NOTE — TELEPHONE ENCOUNTER
From: Nhi Garay  To: Teodora Whiteside MD  Sent: 4/1/2023 2:24 PM CDT  Subject: Medication List    Hi Dr. Mary Jane Rothman,  I have my annual physical upcoming 4/19. I noticed my Adderall prescription was removed from the meds list. Can I refill before the in person visit? My last refill was 12/28/22. I also have labs that will be completed 4/3.   Thanks,  [facility]

## 2023-04-09 LAB
TESTOSTERONE TOTAL: 559.5 NG/DL
TESTOSTERONE, FREE -MS/MS: 46.9 PG/ML

## 2023-04-12 RX ORDER — TADALAFIL 10 MG/1
TABLET ORAL
Qty: 10 TABLET | Refills: 3 | Status: SHIPPED | OUTPATIENT
Start: 2023-04-12

## 2023-04-12 NOTE — TELEPHONE ENCOUNTER
Requesting Tadalafil 10mg  LOV: 9/28/22  RTC: 6 months  Last Relevant Labs: 4/3/23  Filled: 12/28/22 #10 with 3 refills    Future Appointments   Date Time Provider Mark Crowe   4/19/2023  1:00 PM Светлана Buckner MD [facility] [address] [facility] [address]     Rx pended and routed for approval/denial Never

## 2023-04-19 ENCOUNTER — OFFICE VISIT (OUTPATIENT)
Dept: FAMILY MEDICINE CLINIC | Facility: CLINIC | Age: 48
End: 2023-04-19
Payer: COMMERCIAL

## 2023-04-19 VITALS
OXYGEN SATURATION: 100 % | HEIGHT: 69 IN | WEIGHT: 176.25 LBS | BODY MASS INDEX: 26.11 KG/M2 | DIASTOLIC BLOOD PRESSURE: 62 MMHG | TEMPERATURE: 97 F | SYSTOLIC BLOOD PRESSURE: 120 MMHG | RESPIRATION RATE: 16 BRPM | HEART RATE: 67 BPM

## 2023-04-19 DIAGNOSIS — Z00.00 ROUTINE ADULT HEALTH MAINTENANCE: Primary | ICD-10-CM

## 2023-04-19 DIAGNOSIS — R79.89 LOW TESTOSTERONE IN MALE: ICD-10-CM

## 2023-04-19 PROCEDURE — 3008F BODY MASS INDEX DOCD: CPT | Performed by: FAMILY MEDICINE

## 2023-04-19 PROCEDURE — 3078F DIAST BP <80 MM HG: CPT | Performed by: FAMILY MEDICINE

## 2023-04-19 PROCEDURE — 99396 PREV VISIT EST AGE 40-64: CPT | Performed by: FAMILY MEDICINE

## 2023-04-19 PROCEDURE — 3074F SYST BP LT 130 MM HG: CPT | Performed by: FAMILY MEDICINE

## 2023-05-01 ENCOUNTER — OFFICE VISIT (OUTPATIENT)
Dept: SURGERY | Facility: CLINIC | Age: 48
End: 2023-05-01

## 2023-05-01 DIAGNOSIS — N13.8 BPH WITH OBSTRUCTION/LOWER URINARY TRACT SYMPTOMS: ICD-10-CM

## 2023-05-01 DIAGNOSIS — N40.1 BPH WITH OBSTRUCTION/LOWER URINARY TRACT SYMPTOMS: ICD-10-CM

## 2023-05-01 DIAGNOSIS — N52.9 ERECTILE DYSFUNCTION, UNSPECIFIED ERECTILE DYSFUNCTION TYPE: ICD-10-CM

## 2023-05-01 DIAGNOSIS — E29.1 HYPOGONADISM IN MALE: Primary | ICD-10-CM

## 2023-05-01 DIAGNOSIS — Z12.5 SCREENING PSA (PROSTATE SPECIFIC ANTIGEN): ICD-10-CM

## 2023-05-01 LAB
APPEARANCE: CLEAR
BILIRUBIN: NEGATIVE
GLUCOSE (URINE DIPSTICK): NEGATIVE MG/DL
KETONES (URINE DIPSTICK): NEGATIVE MG/DL
LEUKOCYTES: NEGATIVE
MULTISTIX LOT#: NORMAL NUMERIC
NITRITE, URINE: NEGATIVE
OCCULT BLOOD: NEGATIVE
PH, URINE: 5 (ref 4.5–8)
PROTEIN (URINE DIPSTICK): NEGATIVE MG/DL
SPECIFIC GRAVITY: 1.02 (ref 1–1.03)
URINE-COLOR: YELLOW
UROBILINOGEN,SEMI-QN: 0.2 MG/DL (ref 0–1.9)

## 2023-05-01 RX ORDER — TADALAFIL 20 MG/1
20 TABLET ORAL
Qty: 30 TABLET | Refills: 5 | Status: SHIPPED | OUTPATIENT
Start: 2023-05-01

## 2023-05-01 NOTE — PATIENT INSTRUCTIONS
Natural Ways to Boost Testosterone:    Exercise/Maintaining Ideal Body Weight  - Exercise at least three times per week, at least 30 minutes per session.  - Resistance training in particular (weight lifting) has been shown to increase T levels. Cardio exercise (jogging, elliptical, bike riding) is also important for heart health. - Obesity can lead to excessive conversion of circulating testosterone to estrogen. Healthy diet  - Focus on lean protein, complex carbohydrates, heart-healthy fats (avocados, olive oil)  - Avoid simple carbohydrates, processed foods    Get enough quality sleep & try to go to bed and wake up around the same time every day  - Most adults need around 7-9 hours sleep every night  - One study showed men who slept < 5 hours per night had a 10-15% lower T level compared to when they had a full night's sleep. - Stick to a sleep schedule (regular bedtime and wake up time) as T secretion can decrease if this fluctuates regularly. Minimize Stress  - High cortisol levels (the hormone produced during stress) leads to a reduction in circulating T.     Avoid Excessive Alcohol or Illicit Drugs    Regular Ejaculation

## 2023-05-19 ENCOUNTER — PATIENT MESSAGE (OUTPATIENT)
Dept: FAMILY MEDICINE CLINIC | Facility: CLINIC | Age: 48
End: 2023-05-19

## 2023-05-22 NOTE — TELEPHONE ENCOUNTER
From: Cinda Garay  To: Emmanuel Villalobos MD  Sent: 5/19/2023 8:03 PM CDT  Subject: Medication refill    Dr. Amaury Ruffin,  My adderall prescription has been removed from the meds list. I am in need of a refill. Thank you.

## 2023-05-23 RX ORDER — DEXTROAMPHETAMINE SACCHARATE, AMPHETAMINE ASPARTATE MONOHYDRATE, DEXTROAMPHETAMINE SULFATE AND AMPHETAMINE SULFATE 5; 5; 5; 5 MG/1; MG/1; MG/1; MG/1
20 CAPSULE, EXTENDED RELEASE ORAL EVERY MORNING
Refills: 0 | Status: CANCELLED
Start: 2023-05-23

## 2023-05-23 RX ORDER — DEXTROAMPHETAMINE SACCHARATE, AMPHETAMINE ASPARTATE MONOHYDRATE, DEXTROAMPHETAMINE SULFATE AND AMPHETAMINE SULFATE 5; 5; 5; 5 MG/1; MG/1; MG/1; MG/1
20 CAPSULE, EXTENDED RELEASE ORAL DAILY
Qty: 30 CAPSULE | Refills: 0 | Status: SHIPPED | OUTPATIENT
Start: 2023-06-23 | End: 2023-07-23

## 2023-05-23 RX ORDER — DEXTROAMPHETAMINE SACCHARATE, AMPHETAMINE ASPARTATE MONOHYDRATE, DEXTROAMPHETAMINE SULFATE AND AMPHETAMINE SULFATE 5; 5; 5; 5 MG/1; MG/1; MG/1; MG/1
20 CAPSULE, EXTENDED RELEASE ORAL DAILY
Qty: 30 CAPSULE | Refills: 0 | Status: SHIPPED | OUTPATIENT
Start: 2023-07-24 | End: 2023-08-23

## 2023-05-23 RX ORDER — DEXTROAMPHETAMINE SACCHARATE, AMPHETAMINE ASPARTATE MONOHYDRATE, DEXTROAMPHETAMINE SULFATE AND AMPHETAMINE SULFATE 5; 5; 5; 5 MG/1; MG/1; MG/1; MG/1
20 CAPSULE, EXTENDED RELEASE ORAL DAILY
Qty: 30 CAPSULE | Refills: 0 | Status: SHIPPED | OUTPATIENT
Start: 2023-05-23 | End: 2023-06-22

## 2023-05-23 NOTE — TELEPHONE ENCOUNTER
Requesting Adderall XR 20mg  LOV: 4/19/23 Physical  RTC: 1 year  Last Relevant Labs: 4/3/23  Filled: 4/3/23 #30 with 0 refills    No future appointments.     Per [place] , last dispensed 4/3/23 #30    Rx pended and routed for approval/denial

## 2024-08-07 ENCOUNTER — OFFICE VISIT (OUTPATIENT)
Dept: FAMILY MEDICINE CLINIC | Facility: CLINIC | Age: 49
End: 2024-08-07
Payer: COMMERCIAL

## 2024-08-07 VITALS
OXYGEN SATURATION: 98 % | WEIGHT: 181 LBS | HEART RATE: 66 BPM | TEMPERATURE: 97 F | DIASTOLIC BLOOD PRESSURE: 88 MMHG | HEIGHT: 69 IN | SYSTOLIC BLOOD PRESSURE: 112 MMHG | BODY MASS INDEX: 26.81 KG/M2 | RESPIRATION RATE: 14 BRPM

## 2024-08-07 DIAGNOSIS — Z00.00 ROUTINE GENERAL MEDICAL EXAMINATION AT A HEALTH CARE FACILITY: Primary | ICD-10-CM

## 2024-08-07 DIAGNOSIS — L70.8 OTHER ACNE: ICD-10-CM

## 2024-08-07 DIAGNOSIS — F98.8 ATTENTION DEFICIT DISORDER (ADD) WITHOUT HYPERACTIVITY: ICD-10-CM

## 2024-08-07 PROCEDURE — 3074F SYST BP LT 130 MM HG: CPT | Performed by: FAMILY MEDICINE

## 2024-08-07 PROCEDURE — 3008F BODY MASS INDEX DOCD: CPT | Performed by: FAMILY MEDICINE

## 2024-08-07 PROCEDURE — 99396 PREV VISIT EST AGE 40-64: CPT | Performed by: FAMILY MEDICINE

## 2024-08-07 PROCEDURE — 3079F DIAST BP 80-89 MM HG: CPT | Performed by: FAMILY MEDICINE

## 2024-08-07 RX ORDER — DEXTROAMPHETAMINE SACCHARATE, AMPHETAMINE ASPARTATE MONOHYDRATE, DEXTROAMPHETAMINE SULFATE AND AMPHETAMINE SULFATE 2.5; 2.5; 2.5; 2.5 MG/1; MG/1; MG/1; MG/1
10 CAPSULE, EXTENDED RELEASE ORAL EVERY MORNING
Qty: 30 CAPSULE | Refills: 0 | Status: SHIPPED | OUTPATIENT
Start: 2024-08-07 | End: 2024-09-06

## 2024-08-07 RX ORDER — CLINDAMYCIN AND BENZOYL PEROXIDE 10; 50 MG/G; MG/G
GEL TOPICAL
Qty: 50 G | Refills: 1 | Status: SHIPPED | OUTPATIENT
Start: 2024-08-07

## 2024-08-07 NOTE — PROGRESS NOTES
Gaston Garay is a 49 year old male who presents for a complete physical exam.   HPI:   Pt complains of nothing.  Oily skin, scalp acne  He would like to try topical antibiotic medicine if possible.  He has used the benzyl peroxide and just dries out the skin.    Neuropysch, brain exercises.   ADD     Last year took add meds.     Uses mainly during work week.     Tolerated Adderall in the past.  Would like to have prescription for it again.      Urination changes no  ED symptoms no  Immunizations needed no  Wt Readings from Last 6 Encounters:   08/07/24 181 lb (82.1 kg)   04/19/23 176 lb 4 oz (79.9 kg)   09/28/22 183 lb 4 oz (83.1 kg)   03/21/22 174 lb 4 oz (79 kg)   02/09/22 170 lb (77.1 kg)   01/27/22 170 lb (77.1 kg)     Body mass index is 26.73 kg/m².     Results for orders placed or performed in visit on 05/01/23   URINALYSIS, AUTO, W/O SCOPE   Result Value Ref Range    Glucose Urine Negative Negative mg/dL    Bilirubin Urine Negative Negative    Ketones, UA Negative Negative - Trace mg/dL    Spec Gravity 1.020 1.005 - 1.030    Blood Urine Negative Negative    PH Urine 5.0 5.0 - 8.0    Protein Urine Negative Negative - Trace mg/dL    Urobilinogen Urine 0.2 0.2 - 1.0 mg/dL    Nitrite Urine Negative Negative    Leukocyte Esterase Urine Negative Negative    APPEARANCE clear Clear    Color Urine yellow Yellow    Multistix Lot# [id number] Numeric    Multistix Expiration Date 9/30/23 Date       Current Outpatient Medications   Medication Sig Dispense Refill    amphetamine-dextroamphetamine ER (ADDERALL XR) 10 MG Oral Capsule SR 24 Hr Take 1 capsule (10 mg total) by mouth every morning. 30 capsule 0    Clindamycin Phos-Benzoyl Perox 1-5 % External Gel Apply a small amount to acne lesions once a day 50 g 1    Tadalafil (CIALIS) 20 MG Oral Tab Take 1 tablet (20 mg total) by mouth daily as needed for Erectile Dysfunction. 30 tablet 5      Past Medical History:    Hemorrhoids      Past Surgical History:   Procedure  Laterality Date    Colonoscopy      Other surgical history  12/31/2021    shoulder surgery - labral repair    Vasectomy  2/14/2014    Dr. Jenkins       Family History   Problem Relation Age of Onset    Colon Cancer Maternal Grandfather     Stroke Paternal Grandfather       Social History:  Social History     Socioeconomic History    Marital status:    Tobacco Use    Smoking status: Never    Smokeless tobacco: Never   Vaping Use    Vaping status: Never Used   Substance and Sexual Activity    Alcohol use: Yes     Alcohol/week: 3.0 - 5.0 standard drinks of alcohol     Types: 3 - 5 Cans of beer per week     Comment: 3 times per week    Drug use: No   Other Topics Concern    Caffeine Concern Yes     Comment: 1 cup of coffee per day;2 sodas per week    Exercise Yes     Comment: 2 days per week         REVIEW OF SYSTEMS:   GENERAL: feels well overall, denies fever or chills, denies change in weight  SKIN: denies any unusual skin lesions  EYES: denies changes in vision  HENT: denies upper respiratory symptoms  LUNGS: denies OSCAR, wheezing, cough, orthopnea and PND  CARDIOVASCULAR: denies CP, palpitations, rapid or slow heart rate. Denies VÁSQUEZ/lower extremity swelling  GI: denies abdominal pain,denies heartburn, denies n/v/c/d/change in stools/blood in stool/black stool/change in appetite  : denies nocturia or changes in urinary stream, denies scrotal mass/abnormal discharge from urethra  MUSCULOSKELETAL: denies joint or muscle aches or pains  NEURO: denies headaches/dizziness  PSYCH: denies depression or anxiety  HEMATOLOGIC: denies easy bruising/bleeding/anemia/blood clot disorders  ENDOCRINE: denies weight gain/weight loss/enlargement of neck glands/polyuria/polydypsia  ALL/ASTHMA: denies allergic rhinitis/asthma    EXAM:   /88   Pulse 66   Temp 97.4 °F (36.3 °C) (Temporal)   Resp 14   Ht 5' 9\" (1.753 m)   Wt 181 lb (82.1 kg)   SpO2 98%   BMI 26.73 kg/m²   Body mass index is 26.73 kg/m².   GENERAL: NAD,  pleasant, well developed, normal voice  SKIN: no rashes, no suspicious moles or lesions  HENT: NCAT, EACs clear b/l, TMs normal b/l, nasal turbinates normal b/l, oropharynx with mmm/clear/normal, gingiva normal, lips normal  EYES: PERRLA, EOMI, conjunctiva non-injected and non-icteric  NECK: supple, no adenopathy/thyromegaly/thyroid nodules/masses  CHEST: no chest tenderness  LUNGS: CTA A/P, no wheezes/ronchi/rales/crackles, normal air excursion  CARDIOVASCULAR: RRR, no murmur, no lower extremity edema, pedal and femoral pulses 2+ and symmetric b/l  GI: normoactive BS, non-distended, non-tender to palpation, no HSM/masses/pulsations  MUSCULOSKELETAL: Back with normal AROM, no joint swelling, extremities x 4 with normal strength 5/5 and symmetric and with normal AROM/PROM.   EXTREMITIES: no cyanosis, clubbing or edema  NEURO: A&O x3, CN II-XII grossly intact. Reflexes: biceps and patellar 2+ b/l and symmetric. Gait is normal.  PSYCH: normal affect, no apparent thought disorder, average judgement and insight    Immunization History   Administered Date(s) Administered    Covid-19 Vaccine Moderna 100 mcg/0.5 ml 01/05/2021, 02/02/2021   Deferred Date(s) Deferred    FLULAVAL 6 months & older 0.5 ml Prefilled syringe ([id number]) 09/28/2022       ASSESSMENT AND PLAN:   Gaston Garay is a 49 year old male who presents for a complete physical exam.     Gaston was seen today for physical, immunization/injection and medication request.    Diagnoses and all orders for this visit:    Routine general medical examination at a health care facility  -     Lipid Panel; Future  -     Comp Metabolic Panel (14); Future  -     CBC With Differential With Platelet; Future  -     PSA, Total W Reflex To Free; Future    Attention deficit disorder (ADD) without hyperactivity  -     amphetamine-dextroamphetamine ER (ADDERALL XR) 10 MG Oral Capsule SR 24 Hr; Take 1 capsule (10 mg total) by mouth every morning.    Other acne    Other orders  -      Clindamycin Phos-Benzoyl Perox 1-5 % External Gel; Apply a small amount to acne lesions once a day       I did discuss acne treatment at length.  Will prescribe clindamycin and benzyl peroxide gel to be applied small amount and acne lesions once a day.  This can be used as needed.    Patient was counseled regarding attention deficit disorder and its management.  Will prescribe Adderall XR at 10 mg as he would like to try a lower dose than what he was on previously which was 20 mg.  He had no side effects in the past.  We did discuss side effects and benefits.    Pt educated on immunizations and health maintenance appropriate for age.     The patient verbalizes understanding of these recommendations and agrees to the plan.    The patient is asked to return for complete physical yearly.    There are no Patient Instructions on file for this visit.  No orders of the defined types were placed in this encounter.

## 2024-08-12 ENCOUNTER — TELEPHONE (OUTPATIENT)
Dept: FAMILY MEDICINE CLINIC | Facility: CLINIC | Age: 49
End: 2024-08-12

## 2024-08-12 NOTE — TELEPHONE ENCOUNTER
Received incoming fax from pharmacy on Clindamycin/Benzoy gel, needs PA. Will place fax in MA folder Patient ID#[id number]

## 2024-08-17 NOTE — TELEPHONE ENCOUNTER
PA denied, states pt must have tried two and had poor response to two formulary alt drugs. Documentation must be shown to state why no other alt can work.   Alt: sulfacetamide sodium 10 % lotion, clindamycin-benzoyl peroxide 1.5-5% gel, or erythromycin gel/sol.     Please advise

## 2024-08-19 RX ORDER — CLINDAMYCIN PHOSPHATE AND BENZOYL PEROXIDE 10; 50 MG/G; MG/G
1 GEL TOPICAL ONCE
Qty: 45 G | Refills: 0 | Status: SHIPPED | OUTPATIENT
Start: 2024-08-19 | End: 2024-08-19

## 2024-09-19 ENCOUNTER — PATIENT MESSAGE (OUTPATIENT)
Dept: FAMILY MEDICINE CLINIC | Facility: CLINIC | Age: 49
End: 2024-09-19

## 2024-09-19 DIAGNOSIS — F98.8 ATTENTION DEFICIT DISORDER (ADD) WITHOUT HYPERACTIVITY: ICD-10-CM

## 2024-09-19 RX ORDER — DEXTROAMPHETAMINE SACCHARATE, AMPHETAMINE ASPARTATE MONOHYDRATE, DEXTROAMPHETAMINE SULFATE AND AMPHETAMINE SULFATE 2.5; 2.5; 2.5; 2.5 MG/1; MG/1; MG/1; MG/1
10 CAPSULE, EXTENDED RELEASE ORAL EVERY MORNING
Qty: 30 CAPSULE | Refills: 0 | Status: SHIPPED | OUTPATIENT
Start: 2024-09-19 | End: 2024-10-19

## 2024-09-19 NOTE — TELEPHONE ENCOUNTER
Requesting Adderall ER 10 mg  Last OV: 8/7/24  Last Rx'd 8/8/24    No future appointments.    Non-protocol med:  Rx pended and routed for approval/denial

## 2024-09-19 NOTE — TELEPHONE ENCOUNTER
From: Gaston Garay  To: Galileo Herrera  Sent: 9/19/2024 12:19 PM CDT  Subject: Adderall Prescription    Dr. Herrera,  I was unable to refill my 10mg Adderall prescription as it was not listed in MyChart. Please advise.

## 2024-10-06 DIAGNOSIS — N52.9 ERECTILE DYSFUNCTION, UNSPECIFIED ERECTILE DYSFUNCTION TYPE: ICD-10-CM

## 2024-10-07 DIAGNOSIS — N52.9 ERECTILE DYSFUNCTION, UNSPECIFIED ERECTILE DYSFUNCTION TYPE: ICD-10-CM

## 2024-10-07 RX ORDER — TADALAFIL 20 MG/1
20 TABLET ORAL
Qty: 30 TABLET | Refills: 5 | OUTPATIENT
Start: 2024-10-07

## 2024-10-08 RX ORDER — TADALAFIL 20 MG/1
20 TABLET ORAL
Qty: 30 TABLET | Refills: 5 | OUTPATIENT
Start: 2024-10-08

## 2024-10-14 ENCOUNTER — PATIENT MESSAGE (OUTPATIENT)
Dept: FAMILY MEDICINE CLINIC | Facility: CLINIC | Age: 49
End: 2024-10-14

## 2024-10-14 DIAGNOSIS — N52.9 ERECTILE DYSFUNCTION, UNSPECIFIED ERECTILE DYSFUNCTION TYPE: ICD-10-CM

## 2024-10-15 RX ORDER — TADALAFIL 20 MG/1
20 TABLET ORAL
Qty: 30 TABLET | Refills: 3 | Status: SHIPPED | OUTPATIENT
Start: 2024-10-15

## 2024-10-15 NOTE — TELEPHONE ENCOUNTER
Tadalafil (CIALIS) 20 MG Oral Tab 30 tablet 5 5/1/2023 --    Sig - Route: Take 1 tablet (20 mg total) by mouth daily as needed for Erectile Dysfunction. - Oral    Sent to pharmacy as: Tadalafil 20 MG Oral Tablet (Cialis)    E-Prescribing Status: Receipt confirmed by pharmacy (5/1/2023 11:37 AM CDT)      Last OV 8/7/24 fpr physical     RX last prescribed by urology   Please advise if patient needs to see urology

## 2024-11-06 ENCOUNTER — PATIENT MESSAGE (OUTPATIENT)
Dept: FAMILY MEDICINE CLINIC | Facility: CLINIC | Age: 49
End: 2024-11-06

## 2025-01-09 DIAGNOSIS — N52.9 ERECTILE DYSFUNCTION, UNSPECIFIED ERECTILE DYSFUNCTION TYPE: ICD-10-CM

## 2025-01-09 RX ORDER — TADALAFIL 20 MG/1
20 TABLET ORAL
Qty: 30 TABLET | Refills: 3 | Status: SHIPPED | OUTPATIENT
Start: 2025-01-09

## 2025-01-09 NOTE — TELEPHONE ENCOUNTER
Requesting Tadalafil 20mg  LOV: 8/7/24 Physical  RTC: 1 year  Last Relevant Labs: 4/3/23  Filled: 10/15/24 #30 with 2 refills    No future appointments.    Genitourinary Medications [id number]/09/2025 07:32 AM   Protocol Details   Patient does not have pulmonary hypertension on problem list    In person appointment or virtual visit in the past 12 mos or appointment in next 3 mos    Medication is active on med list     Rx sent to pharmacy per protocol

## (undated) DEVICE — MEDI-VAC NON-CONDUCTIVE SUCTION TUBING: Brand: CARDINAL HEALTH

## (undated) DEVICE — Device

## (undated) DEVICE — SUTURE PASSER AR-4068-45L

## (undated) DEVICE — LIGHT HANDLE

## (undated) DEVICE — PAD SACRAL SPAN AID

## (undated) DEVICE — 3.3 MM INTEGRATED CABLE WAND ICW, COVATOR 20, 20 DEGREE: Brand: COBLATION

## (undated) DEVICE — 3M™ IOBAN™ 2 ANTIMICROBIAL INCISE DRAPE 6648EZ: Brand: IOBAN™ 2

## (undated) DEVICE — SUTURE TAPE

## (undated) DEVICE — STERILE POLYISOPRENE POWDER-FREE SURGICAL GLOVES WITH EMOLLIENT COATING: Brand: PROTEXIS

## (undated) DEVICE — DRAPE,U/SHT,SPLIT,FILM,60X84,STERILE: Brand: MEDLINE

## (undated) DEVICE — 3M™ STERI-STRIP™ REINFORCED ADHESIVE SKIN CLOSURES, R1547, 1/2 IN X 4 IN (12 MM X 100 MM), 6 STRIPS/ENVELOPE: Brand: 3M™ STERI-STRIP™

## (undated) DEVICE — KIT TRC TRIMANO BEACH CHR ARM

## (undated) DEVICE — SUTURE MONOCRYL 3-0 PS-2

## (undated) DEVICE — STERILE POLYISOPRENE POWDER-FREE SURGICAL GLOVES: Brand: PROTEXIS

## (undated) DEVICE — CONVERTORS STOCKINETTE: Brand: CONVERTORS

## (undated) DEVICE — SUTURE TIGERLOOP #2

## (undated) DEVICE — SUTURE PASSER AR-4068-90

## (undated) DEVICE — 3M™ TEGADERM™ +PAD FILM DRESSING WITH NON-ADHERENT PAD, 3587, 3-1/2 IN X 4-1/8 IN (9 CM X 10.5 CM), 25/CAR, 4 CAR/CS: Brand: 3M™ TEGADERM™

## (undated) DEVICE — #15 STERILE STAINLESS BLADE: Brand: STERILE STAINLESS BLADES

## (undated) DEVICE — EXOFIN TISSUE ADHESIVE 1.0ML

## (undated) DEVICE — SUTURE PASSER AR-4068-45R

## (undated) DEVICE — 3M™ TEGADERM™ +PAD FILM DRESSING WITH NON-ADHERENT PAD, 3584, 2-3/8 IN X 4 IN (6 CM X 10 CM), 50/CAR, 4 CAR/CS: Brand: 3M™ TEGADERM™

## (undated) DEVICE — GAUZE SPONGES,12 PLY: Brand: CURITY

## (undated) DEVICE — 1010 S-DRAPE TOWEL DRAPE 10/BX: Brand: STERI-DRAPE™

## (undated) DEVICE — DUAL SPIKE ADAPTER: Brand: CONMED

## (undated) DEVICE — COVER,MAYO STAND,STERILE: Brand: MEDLINE

## (undated) DEVICE — HANDLE LIGHT ECONOMY

## (undated) DEVICE — TUBING IRR 16FT CNT WV 3 ASCP

## (undated) DEVICE — [TOMCAT CUTTER, ARTHROSCOPIC SHAVER BLADE,  DO NOT RESTERILIZE,  DO NOT USE IF PACKAGE IS DAMAGED,  KEEP DRY,  KEEP AWAY FROM SUNLIGHT]: Brand: FORMULA

## (undated) DEVICE — SOL  .9 3000ML

## (undated) DEVICE — DRILL SRG 2.9 MM SHRT PUSHLOCK

## (undated) DEVICE — GOWN,SIRUS,FABRIC-REINFORCED,X-LARGE: Brand: MEDLINE

## (undated) DEVICE — TUBING DW OUTFLOW

## (undated) DEVICE — SCD SLEEVE KNEE HI BLEND

## (undated) DEVICE — SHEET,DRAPE,70X100,STERILE: Brand: MEDLINE

## (undated) DEVICE — SHOULDER ARTHROSCOPY CDS-LF: Brand: MEDLINE INDUSTRIES, INC.

## (undated) DEVICE — ALCOHOL 70% 4 OZ

## (undated) DEVICE — SUTURE MONOCRYL 2-0 SH

## (undated) NOTE — LETTER
08/10/17        [place] [id number]      Dear Shital Lim,    Our records indicate that you have outstanding lab work and or testing that was ordered for you and has not yet been completed:          Lipid Panel [E]      Com

## (undated) NOTE — LETTER
10/12/17        [place] [id number]      Dear Farhat Lombardi records indicate that you have outstanding lab work and or testing that was ordered for you and has not yet been completed:          AST (SGOT) [E]      ALT(